# Patient Record
Sex: FEMALE | Race: WHITE | NOT HISPANIC OR LATINO | Employment: UNEMPLOYED | URBAN - METROPOLITAN AREA
[De-identification: names, ages, dates, MRNs, and addresses within clinical notes are randomized per-mention and may not be internally consistent; named-entity substitution may affect disease eponyms.]

---

## 2018-03-21 ENCOUNTER — HOSPITAL ENCOUNTER (EMERGENCY)
Facility: HOSPITAL | Age: 28
Discharge: HOME | End: 2018-03-21
Attending: FAMILY MEDICINE
Payer: MEDICAID

## 2018-03-21 VITALS
SYSTOLIC BLOOD PRESSURE: 127 MMHG | RESPIRATION RATE: 20 BRPM | TEMPERATURE: 98 F | DIASTOLIC BLOOD PRESSURE: 62 MMHG | WEIGHT: 180 LBS | BODY MASS INDEX: 33.13 KG/M2 | OXYGEN SATURATION: 100 % | HEART RATE: 125 BPM | HEIGHT: 62 IN

## 2018-03-21 DIAGNOSIS — R09.81 NASAL CONGESTION: Primary | ICD-10-CM

## 2018-03-21 DIAGNOSIS — R05.9 COUGH: ICD-10-CM

## 2018-03-21 LAB
ALBUMIN SERPL BCP-MCNC: 3.7 G/DL
ALP SERPL-CCNC: 139 U/L
ALT SERPL W/O P-5'-P-CCNC: 39 U/L
ANION GAP SERPL CALC-SCNC: 10 MMOL/L
AST SERPL-CCNC: 28 U/L
BASOPHILS # BLD AUTO: 0.06 K/UL
BASOPHILS NFR BLD: 0.5 %
BILIRUB SERPL-MCNC: 0.4 MG/DL
BNP SERPL-MCNC: <10 PG/ML
BUN SERPL-MCNC: 14 MG/DL
CALCIUM SERPL-MCNC: 9.6 MG/DL
CHLORIDE SERPL-SCNC: 108 MMOL/L
CO2 SERPL-SCNC: 22 MMOL/L
CREAT SERPL-MCNC: 0.9 MG/DL
DIFFERENTIAL METHOD: ABNORMAL
EOSINOPHIL # BLD AUTO: 0.2 K/UL
EOSINOPHIL NFR BLD: 1.3 %
ERYTHROCYTE [DISTWIDTH] IN BLOOD BY AUTOMATED COUNT: 12.8 %
EST. GFR  (AFRICAN AMERICAN): >60 ML/MIN/1.73 M^2
EST. GFR  (NON AFRICAN AMERICAN): >60 ML/MIN/1.73 M^2
GLUCOSE SERPL-MCNC: 107 MG/DL
HCT VFR BLD AUTO: 41.4 %
HGB BLD-MCNC: 13.8 G/DL
IMM GRANULOCYTES # BLD AUTO: 0.09 K/UL
IMM GRANULOCYTES NFR BLD AUTO: 0.7 %
LYMPHOCYTES # BLD AUTO: 3.2 K/UL
LYMPHOCYTES NFR BLD: 25.8 %
MCH RBC QN AUTO: 29.8 PG
MCHC RBC AUTO-ENTMCNC: 33.3 G/DL
MCV RBC AUTO: 89 FL
MONOCYTES # BLD AUTO: 1.2 K/UL
MONOCYTES NFR BLD: 9.2 %
NEUTROPHILS # BLD AUTO: 7.8 K/UL
NEUTROPHILS NFR BLD: 62.5 %
NRBC BLD-RTO: 0 /100 WBC
PLATELET # BLD AUTO: 251 K/UL
PMV BLD AUTO: 9.5 FL
POTASSIUM SERPL-SCNC: 3.8 MMOL/L
PROT SERPL-MCNC: 7.4 G/DL
RBC # BLD AUTO: 4.63 M/UL
SODIUM SERPL-SCNC: 140 MMOL/L
TROPONIN I SERPL DL<=0.01 NG/ML-MCNC: <0.006 NG/ML
WBC # BLD AUTO: 12.5 K/UL

## 2018-03-21 PROCEDURE — 99284 EMERGENCY DEPT VISIT MOD MDM: CPT | Mod: ,,, | Performed by: FAMILY MEDICINE

## 2018-03-21 PROCEDURE — 93005 ELECTROCARDIOGRAM TRACING: CPT

## 2018-03-21 PROCEDURE — 84484 ASSAY OF TROPONIN QUANT: CPT

## 2018-03-21 PROCEDURE — 25000003 PHARM REV CODE 250: Performed by: PHYSICIAN ASSISTANT

## 2018-03-21 PROCEDURE — 85025 COMPLETE CBC W/AUTO DIFF WBC: CPT

## 2018-03-21 PROCEDURE — 83880 ASSAY OF NATRIURETIC PEPTIDE: CPT

## 2018-03-21 PROCEDURE — 99284 EMERGENCY DEPT VISIT MOD MDM: CPT | Mod: 25

## 2018-03-21 PROCEDURE — 80053 COMPREHEN METABOLIC PANEL: CPT

## 2018-03-21 PROCEDURE — 93010 ELECTROCARDIOGRAM REPORT: CPT | Mod: ,,, | Performed by: INTERNAL MEDICINE

## 2018-03-21 RX ORDER — LORATADINE 10 MG/1
10 TABLET ORAL DAILY
COMMUNITY
End: 2018-12-11 | Stop reason: ALTCHOICE

## 2018-03-21 RX ORDER — BENZONATATE 100 MG/1
100 CAPSULE ORAL
Status: COMPLETED | OUTPATIENT
Start: 2018-03-21 | End: 2018-03-21

## 2018-03-21 RX ADMIN — BENZONATATE 100 MG: 100 CAPSULE ORAL at 07:03

## 2018-03-21 NOTE — ED NOTES
"Pt presents with CP x 2 weeks with congestion and URI symptoms. Pt was seen by PCP and given a steroid shot and and abx but states her symptoms have gotten worse after completion of medications. Pt was also told she was tachycardic, but that it was nothing to worry about. Pt reports palpitations with constant "achy" or "compression" to her left chest.   "

## 2018-03-21 NOTE — PROVIDER PROGRESS NOTES - EMERGENCY DEPT.
Encounter Date: 3/21/2018    ED Physician Progress Notes         EKG - STEMI Decision  Initial Reading: No STEMI present.    I, Jeri Matthews, am scribing for, and in the presence of, Dr. Vega. I performed the above scribed service and the documentation accurately describes the services I performed. I attest to the accuracy of the note.

## 2018-03-21 NOTE — ED NOTES
LOC: The patient is awake, alert and aware of environment with an appropriate affect, the patient is oriented x 3 and speaking appropriately.  APPEARANCE: Patient resting comfortably and in no acute distress, patient is clean and well groomed  SKIN: The skin is warm and dry, color consistent with ethnicity, patient has normal skin turgor and moist mucus membranes, skin intact, no breakdown or brusing noted.  MUSCULOSKELETAL: Patient moving all extremities well, no obvious swelling or deformities noted.   RESPIRATORY: Airway is open and patent, breath sounds clear throughout all lung fields; respirations are spontaneous, patient has a normal effort and rate, no accessory muscle use noted. Pt reports sinus congestion with URI symptoms.   CARDIAC: Patient has no peripheral edema noted, capillary refill < 3 seconds. Pt reports left sided CP with palpitations and is tachycardic.   ABDOMEN: Soft and non tender to palpation, no distention noted. Bowel sounds present x 4  NEUROLOGIC: PERRL, 3mm bilaterally, eyes open spontaneously, behavior appropriate to situation, follows commands, facial expression symmetrical, bilateral hand grasp equal and even, purposeful motor response noted, normal sensation in all extremities when touched with a finger.

## 2018-03-21 NOTE — PROVIDER PROGRESS NOTES - EMERGENCY DEPT.
Encounter Date: 3/21/2018    ED Physician Progress Notes         EKG - STEMI Decision  Initial Reading: No STEMI present.    Normal EKG---sinus tachycardia

## 2018-03-21 NOTE — ED PROVIDER NOTES
"Encounter Date: 3/21/2018    SCRIBE #1 NOTE: I, Jordan Lewis, am scribing for, and in the presence of,  Dr. Oliva. I have scribed the following portions of the note - Other sections scribed: Partial MDM.       History     Chief Complaint   Patient presents with    Nasal Congestion     Pt states "I can't breathe out of my nose".  (+) cough.  Pt had steroid injection and abx without any relief.      Patient is a 27-year-old female with past medical history of depression who presents to the emergency department due to a 2 week history of nonproductive cough and nasal congestion.  Patient states that she went to see her PCP 2 weeks ago for above complaints and EKG and blood work was obtained.  Patient states that she was placed on antibiotic for a upper respiratory infection as well as given a steroid shot.  Patient states she has continued to have a nonproductive cough and shortness of breath.  Patient states she woke this morning and was experiencing chest pressure.  Patient states the chest pressure is constant in nature and does not radiate.  Patient states that pain feels like something is sitting on her chest.  Patient states she does smoke cigarettes and therefore has a constant cough.  Patient denies any fevers, chills, nausea, vomiting, or any other complaints.          Review of patient's allergies indicates:   Allergen Reactions    Pcn [penicillins] Hives     Past Medical History:   Diagnosis Date    Depressed     Depression     Endometriosis      Past Surgical History:   Procedure Laterality Date     SECTION      x2    HYSTERECTOMY      partial     TUBAL LIGATION       History reviewed. No pertinent family history.  Social History   Substance Use Topics    Smoking status: Current Every Day Smoker     Packs/day: 0.50     Types: Cigarettes    Smokeless tobacco: Not on file    Alcohol use No     Review of Systems   Constitutional: Negative for activity change, appetite change, diaphoresis, " fatigue and fever.   HENT: Negative for congestion, dental problem, drooling, ear pain, facial swelling, sore throat and trouble swallowing.    Eyes: Negative for pain, discharge and visual disturbance.   Respiratory: Positive for cough and shortness of breath. Negative for apnea and chest tightness.    Cardiovascular: Positive for chest pain. Negative for palpitations.   Gastrointestinal: Negative for abdominal distention, anal bleeding, blood in stool, diarrhea, nausea and vomiting.   Endocrine: Negative for cold intolerance and polydipsia.   Genitourinary: Negative for decreased urine volume, difficulty urinating, enuresis, frequency and hematuria.   Musculoskeletal: Negative for arthralgias, gait problem, myalgias and neck stiffness.   Skin: Negative for color change and pallor.   Allergic/Immunologic: Negative for environmental allergies.   Neurological: Negative for dizziness, syncope, numbness and headaches.   Psychiatric/Behavioral: Negative for agitation, confusion and dysphoric mood.       Physical Exam     Initial Vitals [03/21/18 1628]   BP Pulse Resp Temp SpO2   (!) 175/100 (!) 128 20 97.8 °F (36.6 °C) 100 %      MAP       125         Physical Exam    Nursing note and vitals reviewed.  Constitutional: She appears well-developed and well-nourished. She is not diaphoretic. No distress.   HENT:   Head: Normocephalic and atraumatic.   Neck: Normal range of motion. Neck supple.   Cardiovascular: Normal rate, regular rhythm and normal heart sounds. Exam reveals no gallop and no friction rub.    No murmur heard.  Pulmonary/Chest: Breath sounds normal. She has no wheezes. She has no rhonchi. She has no rales.   Abdominal: Soft. Bowel sounds are normal. There is no tenderness. There is no rebound and no guarding.   Musculoskeletal: Normal range of motion.   Neurological: She is alert and oriented to person, place, and time.   Skin: Skin is warm and dry. No rash noted. No erythema.   Psychiatric: She has a normal  mood and affect.         ED Course   Procedures  Labs Reviewed   CBC W/ AUTO DIFFERENTIAL - Abnormal; Notable for the following:        Result Value    Immature Granulocytes 0.7 (*)     Gran # (ANC) 7.8 (*)     Immature Grans (Abs) 0.09 (*)     Mono # 1.2 (*)     All other components within normal limits   COMPREHENSIVE METABOLIC PANEL - Abnormal; Notable for the following:     CO2 22 (*)     Alkaline Phosphatase 139 (*)     All other components within normal limits   TROPONIN I   B-TYPE NATRIURETIC PEPTIDE             Medical Decision Making:   History:   Old Medical Records: I decided to obtain old medical records.  Clinical Tests:   Lab Tests: Ordered and Reviewed  Radiological Study: Ordered and Reviewed  Medical Tests: Ordered and Reviewed       APC / Resident Notes:   Patient is a 27-year-old female with past medical history of depression who presents to the emergency department due to a 2 week history of nonproductive cough and nasal congestion.  Physical exam reveals female in no acute distress.  Heart regular rate and rhythm.  Lungs clear to auscultation bilaterally.  Abdomen soft nontender nondistended.  Will obtain lab work and reassess.    CBC shows no leukocytosis with a white blood cell of 12.5.  CMP shows CO2 22 and alkaline phosphatase 139.  Troponin 0.006.  Chest x-ray shows no acute hernia pulmonary finding.  Patient will be discharged home in stable condition.  Patient is follow-up with PCP and near future.  Strict return precautions given.  Plan of treatment discussed with attending physician and he is agreeable.       Scribe Attestation:   Scribe #1: I performed the above scribed service and the documentation accurately describes the services I performed. I attest to the accuracy of the note.               Clinical Impression:   The primary encounter diagnosis was Nasal congestion. A diagnosis of Cough was also pertinent to this visit.    Disposition:   Disposition: Discharged  Condition:  Stable                        Lyly Blum PA-C  03/21/18 2011

## 2018-12-11 ENCOUNTER — OFFICE VISIT (OUTPATIENT)
Dept: SURGERY | Facility: CLINIC | Age: 28
End: 2018-12-11
Payer: MEDICAID

## 2018-12-11 VITALS
BODY MASS INDEX: 35.91 KG/M2 | TEMPERATURE: 98 F | WEIGHT: 195.13 LBS | HEIGHT: 62 IN | HEART RATE: 92 BPM | DIASTOLIC BLOOD PRESSURE: 74 MMHG | SYSTOLIC BLOOD PRESSURE: 116 MMHG

## 2018-12-11 DIAGNOSIS — K31.84 GASTROPARESIS: Primary | ICD-10-CM

## 2018-12-11 PROCEDURE — 99999 PR PBB SHADOW E&M-EST. PATIENT-LVL III: CPT | Mod: PBBFAC,,, | Performed by: SURGERY

## 2018-12-11 PROCEDURE — 99203 OFFICE O/P NEW LOW 30 MIN: CPT | Mod: S$PBB,,, | Performed by: SURGERY

## 2018-12-11 PROCEDURE — 99213 OFFICE O/P EST LOW 20 MIN: CPT | Mod: PBBFAC,PO | Performed by: SURGERY

## 2018-12-11 RX ORDER — ALPRAZOLAM 0.5 MG/1
0.5 TABLET ORAL 2 TIMES DAILY PRN
COMMUNITY
End: 2018-12-17

## 2018-12-11 RX ORDER — PANTOPRAZOLE SODIUM 20 MG/1
20 TABLET, DELAYED RELEASE ORAL DAILY
Qty: 30 TABLET | Refills: 11 | Status: SHIPPED | OUTPATIENT
Start: 2018-12-11 | End: 2018-12-17

## 2018-12-11 NOTE — LETTER
December 11, 2018      Virgie Hawkins, MICHAEL-DEMETRIUS  [address] [facility] [address]           [facility]  [address] [place] [id number]  Phone: [phone]          Patient: Norma Kang   MR Number: [id number]   YOB: 1990   Date of Visit: 12/11/2018       Dear Virgie Hawkins:    Thank you for referring Norma Kang to me for evaluation. Attached you will find relevant portions of my assessment and plan of care.    If you have questions, please do not hesitate to call me. I look forward to following Norma Kang along with you.    Sincerely,    Jared Spear MD    Enclosure  CC:  No Recipients    If you would like to receive this communication electronically, please contact [email or web address] or [phone] to request more information on [employer] access.    For providers and/or their staff who would like to refer a patient to [facility], please contact us through our one-stop-shop provider referral line, Inocente Simmons, at [phone].    If you feel you have received this communication in error or would no longer like to receive these types of communications, please e-mail [email or web address]

## 2018-12-11 NOTE — PROGRESS NOTES
Norma is 28-year-old  female patient who is being seen at the request of  at the [facility] in [place], [place].  She was referred for the management of gastroparesis of unknown etiology.  She had been seen by her gastroenterologist for the management of constant nausea.  The upper endoscopic examination revealed that the patient has 2 cm hiatal hernia with LA grade 2 esophagitis.  But for unknown reason, she had not been taking medication for that.  Since the nausea is very nonspecific symptoms, I would tried to manage that hiatal hernia with esophagitis with Protonix for a month to observe if there is some improvement or not.  In a month, we will proceed with repeat gastric emptying study at our institution.  Unfortunately, the patient was informed that gastric pacemaker is not an option for her insurance.  She will follow up with me in a month.  Total time approximately half an hour was spent with this patient, more than 50% of that was spent for treatment planning and counseling.    Jared Spear

## 2018-12-17 ENCOUNTER — HOSPITAL ENCOUNTER (EMERGENCY)
Facility: HOSPITAL | Age: 28
Discharge: HOME | End: 2018-12-17
Attending: EMERGENCY MEDICINE
Payer: MEDICAID

## 2018-12-17 VITALS
BODY MASS INDEX: 34.77 KG/M2 | WEIGHT: 188.94 LBS | SYSTOLIC BLOOD PRESSURE: 135 MMHG | TEMPERATURE: 98 F | RESPIRATION RATE: 18 BRPM | DIASTOLIC BLOOD PRESSURE: 87 MMHG | HEART RATE: 92 BPM | OXYGEN SATURATION: 100 % | HEIGHT: 62 IN

## 2018-12-17 DIAGNOSIS — R20.2 PARESTHESIA: Primary | ICD-10-CM

## 2018-12-17 LAB
ALBUMIN SERPL BCP-MCNC: 4.3 G/DL
ALP SERPL-CCNC: 140 U/L
ALT SERPL W/O P-5'-P-CCNC: 33 U/L
ANION GAP SERPL CALC-SCNC: 10 MMOL/L
AST SERPL-CCNC: 21 U/L
BASOPHILS # BLD AUTO: 0.03 K/UL
BASOPHILS NFR BLD: 0.3 %
BILIRUB SERPL-MCNC: 0.8 MG/DL
BILIRUB UR QL STRIP: NEGATIVE
BNP SERPL-MCNC: 15 PG/ML
BUN SERPL-MCNC: 10 MG/DL
CALCIUM SERPL-MCNC: 10.1 MG/DL
CHLORIDE SERPL-SCNC: 105 MMOL/L
CK SERPL-CCNC: 72 U/L
CLARITY UR REFRACT.AUTO: CLEAR
CO2 SERPL-SCNC: 25 MMOL/L
COLOR UR AUTO: YELLOW
CREAT SERPL-MCNC: 1 MG/DL
DIFFERENTIAL METHOD: NORMAL
EOSINOPHIL # BLD AUTO: 0.1 K/UL
EOSINOPHIL NFR BLD: 0.8 %
ERYTHROCYTE [DISTWIDTH] IN BLOOD BY AUTOMATED COUNT: 13 %
EST. GFR  (AFRICAN AMERICAN): >60 ML/MIN/1.73 M^2
EST. GFR  (NON AFRICAN AMERICAN): >60 ML/MIN/1.73 M^2
GLUCOSE SERPL-MCNC: 94 MG/DL
GLUCOSE UR QL STRIP: NEGATIVE
HCT VFR BLD AUTO: 41 %
HGB BLD-MCNC: 14 G/DL
HGB UR QL STRIP: NEGATIVE
KETONES UR QL STRIP: NEGATIVE
LEUKOCYTE ESTERASE UR QL STRIP: NEGATIVE
LYMPHOCYTES # BLD AUTO: 4.4 K/UL
LYMPHOCYTES NFR BLD: 37.5 %
MCH RBC QN AUTO: 30.3 PG
MCHC RBC AUTO-ENTMCNC: 34.1 G/DL
MCV RBC AUTO: 89 FL
MONOCYTES # BLD AUTO: 0.9 K/UL
MONOCYTES NFR BLD: 8.1 %
NEUTROPHILS # BLD AUTO: 6.2 K/UL
NEUTROPHILS NFR BLD: 53.1 %
NITRITE UR QL STRIP: NEGATIVE
PH UR STRIP: 6 [PH] (ref 5–8)
PLATELET # BLD AUTO: 270 K/UL
PMV BLD AUTO: 9.9 FL
POTASSIUM SERPL-SCNC: 3.8 MMOL/L
PROT SERPL-MCNC: 7.6 G/DL
PROT UR QL STRIP: NEGATIVE
RBC # BLD AUTO: 4.62 M/UL
SODIUM SERPL-SCNC: 140 MMOL/L
SP GR UR STRIP: 1.01 (ref 1–1.03)
TROPONIN I SERPL DL<=0.01 NG/ML-MCNC: <0.006 NG/ML
URN SPEC COLLECT METH UR: NORMAL
UROBILINOGEN UR STRIP-ACNC: NEGATIVE EU/DL
WBC # BLD AUTO: 11.64 K/UL

## 2018-12-17 PROCEDURE — 85025 COMPLETE CBC W/AUTO DIFF WBC: CPT

## 2018-12-17 PROCEDURE — 99900035 HC TECH TIME PER 15 MIN (STAT)

## 2018-12-17 PROCEDURE — 99285 EMERGENCY DEPT VISIT HI MDM: CPT | Mod: 25

## 2018-12-17 PROCEDURE — 84484 ASSAY OF TROPONIN QUANT: CPT

## 2018-12-17 PROCEDURE — 81003 URINALYSIS AUTO W/O SCOPE: CPT

## 2018-12-17 PROCEDURE — 80053 COMPREHEN METABOLIC PANEL: CPT

## 2018-12-17 PROCEDURE — 82550 ASSAY OF CK (CPK): CPT

## 2018-12-17 PROCEDURE — 93005 ELECTROCARDIOGRAM TRACING: CPT

## 2018-12-17 PROCEDURE — 83880 ASSAY OF NATRIURETIC PEPTIDE: CPT

## 2018-12-17 PROCEDURE — 93010 ELECTROCARDIOGRAM REPORT: CPT | Mod: ,,, | Performed by: INTERNAL MEDICINE

## 2018-12-17 NOTE — ED PROVIDER NOTES
Encounter Date: 2018       History     Chief Complaint   Patient presents with    Numbness     Patient has a hx of seizures and while driving left side of body and face became numb. Patient under a lot of stress lost spouse in sept     Patient states she had numbness and tingling to her entire left arm just prior to arrival.  Denies any pain or weakness.      The history is provided by the patient.   General Illness    The current episode started just prior to arrival. The problem occurs rarely. The problem has been unchanged. Nothing relieves the symptoms. Nothing aggravates the symptoms. Pertinent negatives include no fever, no abdominal pain, no diarrhea, no nausea, no vomiting, no sore throat, no cough, no shortness of breath and no rash. She has received no recent medical care.     Review of patient's allergies indicates:   Allergen Reactions    Pcn [penicillins] Hives     Past Medical History:   Diagnosis Date    Depressed     Depression     Endometriosis     Seizures      Past Surgical History:   Procedure Laterality Date     SECTION      x2    HYSTERECTOMY      partial     TUBAL LIGATION       Family History   Problem Relation Age of Onset    Diabetes Mother     Hypertension Mother     Diabetes Father     Depression Father      Social History     Tobacco Use    Smoking status: Current Every Day Smoker     Packs/day: 0.50     Types: Cigarettes    Smokeless tobacco: Never Used   Substance Use Topics    Alcohol use: No    Drug use: No     Review of Systems   Constitutional: Negative for fever.   HENT: Negative for sore throat.    Respiratory: Negative for cough and shortness of breath.    Cardiovascular: Negative for chest pain.   Gastrointestinal: Negative for abdominal pain, diarrhea, nausea and vomiting.   Genitourinary: Negative for dysuria.   Musculoskeletal: Negative for back pain.   Skin: Negative for rash.   Neurological: Negative for weakness.   Hematological: Does not  bruise/bleed easily.       Physical Exam     Initial Vitals [12/17/18 1714]   BP Pulse Resp Temp SpO2   (!) 174/96 104 18 98.4 °F (36.9 °C) 100 %      MAP       --         Physical Exam    Nursing note and vitals reviewed.  Constitutional: She appears well-developed and well-nourished. No distress.   HENT:   Head: Normocephalic and atraumatic.   Mouth/Throat: Oropharynx is clear and moist.   Eyes: Conjunctivae and EOM are normal. Pupils are equal, round, and reactive to light.   Neck: Normal range of motion. Neck supple.   Cardiovascular: Normal rate, regular rhythm and normal heart sounds. Exam reveals no gallop and no friction rub.    No murmur heard.  Pulmonary/Chest: Breath sounds normal. No respiratory distress. She has no wheezes. She has no rhonchi. She has no rales.   Abdominal: Soft. Bowel sounds are normal. She exhibits no distension and no mass. There is no tenderness. There is no rebound and no guarding.   Musculoskeletal: Normal range of motion. She exhibits no edema or tenderness.   Neurological: She is alert and oriented to person, place, and time. She has normal strength. No sensory deficit.   Skin: Skin is warm and dry. No rash noted.   Psychiatric: She has a normal mood and affect. Thought content normal.         ED Course   Procedures  Labs Reviewed   COMPREHENSIVE METABOLIC PANEL - Abnormal; Notable for the following components:       Result Value    Alkaline Phosphatase 140 (*)     All other components within normal limits   CBC W/ AUTO DIFFERENTIAL   URINALYSIS, REFLEX TO URINE CULTURE    Narrative:     Preferred Collection Type->Urine, Clean Catch   B-TYPE NATRIURETIC PEPTIDE   CK   TROPONIN I     Results for orders placed or performed during the hospital encounter of 12/17/18   CBC auto differential   Result Value Ref Range    WBC 11.64 3.90 - 12.70 K/uL    RBC 4.62 4.00 - 5.40 M/uL    Hemoglobin 14.0 12.0 - 16.0 g/dL    Hematocrit 41.0 37.0 - 48.5 %    MCV 89 82 - 98 fL    MCH 30.3 27.0 -  31.0 pg    MCHC 34.1 32.0 - 36.0 g/dL    RDW 13.0 11.5 - 14.5 %    Platelets 270 150 - 350 K/uL    MPV 9.9 9.2 - 12.9 fL    Gran # (ANC) 6.2 1.8 - 7.7 K/uL    Lymph # 4.4 1.0 - 4.8 K/uL    Mono # 0.9 0.3 - 1.0 K/uL    Eos # 0.1 0.0 - 0.5 K/uL    Baso # 0.03 0.00 - 0.20 K/uL    Gran% 53.1 38.0 - 73.0 %    Lymph% 37.5 18.0 - 48.0 %    Mono% 8.1 4.0 - 15.0 %    Eosinophil% 0.8 0.0 - 8.0 %    Basophil% 0.3 0.0 - 1.9 %    Differential Method Automated    Comprehensive metabolic panel   Result Value Ref Range    Sodium 140 136 - 145 mmol/L    Potassium 3.8 3.5 - 5.1 mmol/L    Chloride 105 95 - 110 mmol/L    CO2 25 23 - 29 mmol/L    Glucose 94 70 - 110 mg/dL    BUN, Bld 10 6 - 20 mg/dL    Creatinine 1.0 0.5 - 1.4 mg/dL    Calcium 10.1 8.7 - 10.5 mg/dL    Total Protein 7.6 6.0 - 8.4 g/dL    Albumin 4.3 3.5 - 5.2 g/dL    Total Bilirubin 0.8 0.1 - 1.0 mg/dL    Alkaline Phosphatase 140 (H) 55 - 135 U/L    AST 21 10 - 40 U/L    ALT 33 10 - 44 U/L    Anion Gap 10 8 - 16 mmol/L    eGFR if African American >60.0 >60 mL/min/1.73 m^2    eGFR if non African American >60.0 >60 mL/min/1.73 m^2   Urinalysis, Reflex to Urine Culture Urine, Clean Catch   Result Value Ref Range    Specimen UA Urine, Clean Catch     Color, UA Yellow Yellow, Straw, Ariane    Appearance, UA Clear Clear    pH, UA 6.0 5.0 - 8.0    Specific Gravity, UA 1.015 1.005 - 1.030    Protein, UA Negative Negative    Glucose, UA Negative Negative    Ketones, UA Negative Negative    Bilirubin (UA) Negative Negative    Occult Blood UA Negative Negative    Nitrite, UA Negative Negative    Urobilinogen, UA Negative <2.0 EU/dL    Leukocytes, UA Negative Negative   Brain natriuretic peptide   Result Value Ref Range    BNP 15 0 - 99 pg/mL   CK   Result Value Ref Range    CPK 72 20 - 180 U/L   Troponin I   Result Value Ref Range    Troponin I <0.006 0.000 - 0.026 ng/mL       EKG Readings: (Independently Interpreted)   Rhythm: Normal Sinus Rhythm. Heart Rate: 90. Ectopy: No Ectopy.  Conduction: Normal. ST Segments: Normal ST Segments. T Waves: Normal. Clinical Impression: Normal Sinus Rhythm       Imaging Results          CT Head Without Contrast (Final result)  Result time 12/17/18 18:25:40    Final result by Rl Jefferson III, MD (12/17/18 18:25:40)                 Impression:      Normal non contrast CT scan of the brain.    All CT scans at this facility are performed  using dose modulation techniques as appropriate to performed exam including the following:  automated exposure control; adjustment of mA and/or kV according to the patients size (this includes techniques or standardized protocols for targeted exams where dose is matched to indication/reason for exam: i.e. extremities or head);  iterative reconstruction technique.      Electronically signed by: Rl Jefferson MD  Date:    12/17/2018  Time:    18:25             Narrative:    EXAMINATION:  CT HEAD WITHOUT CONTRAST    CLINICAL HISTORY:  Dizziness;left sided numbness;    TECHNIQUE:  Standard non contrast CT scan of the brain.    COMPARISON:  Two thousand ten    FINDINGS:  No detrimental change.  The brain and ventricles remain normal for patient age.  There is no mass lesion, hemorrhage, hydrocephalus, or midline shift.  No acute/depressed skull fracture.  No lytic or blastic change.  No acute bony abnormality is suggested.                               X-Ray Cervical Spine AP And Lateral (Final result)  Result time 12/17/18 18:15:35    Final result by Rl Jefferson III, MD (12/17/18 18:15:35)                 Impression:      No acute cervical fracture or significant subluxation.  No significant prevertebral soft tissue swelling.      Electronically signed by: Rl Jefferson MD  Date:    12/17/2018  Time:    18:15             Narrative:    EXAMINATION:  XR CERVICAL SPINE AP LATERAL    CLINICAL HISTORY:  Numbness (782.0);    FINDINGS:  There is mild straightening of the normal lordotic curve which could be  "positional or due to muscle spasm.  No acute cervical fracture or significant subluxation.  No lytic or blastic change.                               X-Ray Chest PA And Lateral (Final result)  Result time 12/17/18 18:14:20    Final result by Rl Jefferson III, MD (12/17/18 18:14:20)                 Impression:      Negative two-view chest x-ray.      Electronically signed by: Rl Jefferson MD  Date:    12/17/2018  Time:    18:14             Narrative:    EXAMINATION:  XR CHEST PA AND LATERAL    CLINICAL HISTORY:  weakness;    COMPARISON:  March    FINDINGS:  Heart size is normal. The lung fields are clear. No acute pulmonary infiltrate.  No detrimental change.                                        ED Vital Signs:  Vitals:    12/17/18 1714 12/17/18 1735 12/17/18 1744   BP: (!) 174/96  135/87   Pulse: 104 92 92   Resp: 18  18   Temp: 98.4 °F (36.9 °C)     TempSrc: Oral     SpO2: 100%  100%   Weight: 85.7 kg (188 lb 15 oz)     Height: 5' 2" (1.575 m)           Abnormal Lab Results:  Labs Reviewed   COMPREHENSIVE METABOLIC PANEL - Abnormal; Notable for the following components:       Result Value    Alkaline Phosphatase 140 (*)     All other components within normal limits   CBC W/ AUTO DIFFERENTIAL   URINALYSIS, REFLEX TO URINE CULTURE    Narrative:     Preferred Collection Type->Urine, Clean Catch   B-TYPE NATRIURETIC PEPTIDE   CK   TROPONIN I          All Lab Results:        Imaging Results:  Imaging Results          CT Head Without Contrast (Final result)  Result time 12/17/18 18:25:40    Final result by Rl Jefferson III, MD (12/17/18 18:25:40)                 Impression:      Normal non contrast CT scan of the brain.    All CT scans at this facility are performed  using dose modulation techniques as appropriate to performed exam including the following:  automated exposure control; adjustment of mA and/or kV according to the patients size (this includes techniques or standardized protocols for " targeted exams where dose is matched to indication/reason for exam: i.e. extremities or head);  iterative reconstruction technique.      Electronically signed by: Rl Jefferson MD  Date:    12/17/2018  Time:    18:25             Narrative:    EXAMINATION:  CT HEAD WITHOUT CONTRAST    CLINICAL HISTORY:  Dizziness;left sided numbness;    TECHNIQUE:  Standard non contrast CT scan of the brain.    COMPARISON:  Two thousand ten    FINDINGS:  No detrimental change.  The brain and ventricles remain normal for patient age.  There is no mass lesion, hemorrhage, hydrocephalus, or midline shift.  No acute/depressed skull fracture.  No lytic or blastic change.  No acute bony abnormality is suggested.                               X-Ray Cervical Spine AP And Lateral (Final result)  Result time 12/17/18 18:15:35    Final result by Rl Jefferson III, MD (12/17/18 18:15:35)                 Impression:      No acute cervical fracture or significant subluxation.  No significant prevertebral soft tissue swelling.      Electronically signed by: Rl Jefferson MD  Date:    12/17/2018  Time:    18:15             Narrative:    EXAMINATION:  XR CERVICAL SPINE AP LATERAL    CLINICAL HISTORY:  Numbness (782.0);    FINDINGS:  There is mild straightening of the normal lordotic curve which could be positional or due to muscle spasm.  No acute cervical fracture or significant subluxation.  No lytic or blastic change.                               X-Ray Chest PA And Lateral (Final result)  Result time 12/17/18 18:14:20    Final result by Rl Jefferson III, MD (12/17/18 18:14:20)                 Impression:      Negative two-view chest x-ray.      Electronically signed by: Rl Jefferson MD  Date:    12/17/2018  Time:    18:14             Narrative:    EXAMINATION:  XR CHEST PA AND LATERAL    CLINICAL HISTORY:  weakness;    COMPARISON:  March    FINDINGS:  Heart size is normal. The lung fields are clear. No acute pulmonary  infiltrate.  No detrimental change.                                   The Emergency Provider reviewed the vital signs and test results, which are outlined above.    ED Discussions:  7:11 PM Pt reports sx have improved. Neurological exam is normal. No M/S loss Normal strength bilateral, Normal speech, symmetric facial m. Nonfocal exam.   7:12 PM - Counseling: Spoke with the patient and discussed todays findings, in addition to providing specific details for the plan of care and counseling regarding the diagnosis and prognosis. Questions are answered at this time.  I have not determined a specific etiology for patient's symptoms based on evaluation in the ED, but I have low suspicion for medical, surgical or other life threatening illness and I believe patient is safe for discharge.  With lack of etiology for the patient's complaints, I specifically counseled the patient and/or family/caretaker that despite an unrevealing evaluation in the ED, patient may still be at risk for serious, even life threatening illness.  As such, patient was instructed to return immediately for any worsening or change in current symptoms, or for any concern.                         Clinical Impression:       ICD-10-CM ICD-9-CM   1. Paresthesia R20.2 782.0           Disposition:   Disposition: Discharged  Condition: Stable                        Govind Garcia MD  12/17/18 1914

## 2020-04-25 ENCOUNTER — HISTORICAL (OUTPATIENT)
Dept: ADMINISTRATIVE | Facility: HOSPITAL | Age: 30
End: 2020-04-25

## 2020-04-25 LAB
ALBUMIN SERPL BCP-MCNC: 4 G/DL (ref 3.5–5)
ALBUMIN/GLOB SERPL ELPH: 1 {RATIO} (ref 1.5–2.2)
ALP SERPL-CCNC: 172 U/L (ref 45–117)
ALT SERPL W P-5'-P-CCNC: 32 U/L (ref 13–56)
ANION GAP SERPL CALC-SCNC: 12.8 MEQ/L (ref 10–20)
AST SERPL-CCNC: 17 U/L (ref 15–37)
BASOPHILS NFR BLD: 0.1 10 (ref 0–0.1)
BASOPHILS NFR BLD: 0.4 % (ref 0–1.5)
BILIRUB SERPL-MCNC: 0.46 MG/DL (ref 0.2–1)
BUN SERPL-MCNC: 6 MG/DL (ref 7–18)
CALCIUM SERPL-MCNC: 9.5 MG/DL (ref 8.5–10.1)
CHLORIDE SERPL-SCNC: 110 MMOL/L (ref 98–107)
CO2 SERPL-SCNC: 23 MMOL/L (ref 22–32)
CREAT SERPL-MCNC: 1.16 MG/DL (ref 0.55–1.02)
EGFR: 59 ML/MIN/1.73M
EOSINOPHIL NFR BLD: 0.1 10 (ref 0–0.7)
EOSINOPHIL NFR BLD: 0.4 % (ref 0–7)
ERYTHROCYTE [DISTWIDTH] IN BLOOD BY AUTOMATED COUNT: 13 % (ref 11.5–14.5)
GLOBULIN: 3.9 G/DL (ref 2.3–3.5)
GLUCOSE SERPL-MCNC: 105 MG/DL (ref 70–99)
GRAN #: 8.63 10 (ref 2–7.5)
GRAN%: 0.4 %
GRAN%: 67.1 % (ref 50–80)
HCT VFR BLD AUTO: 43.4 % (ref 37.7–47.9)
HGB BLD-MCNC: 14.8 G/DL (ref 12.2–16.2)
IMMATURE GRANULOCYTES #: 0.05 10
LYMPH #: 3.4 10 (ref 1–3.5)
LYMPH%: 26.5 % (ref 12–50)
MCH RBC QN AUTO: 30.6 PG (ref 27–31)
MCHC RBC AUTO-ENTMCNC: 34.1 G% (ref 32–35)
MCV RBC AUTO: 89.7 FL (ref 80–97)
MONO #: 0.7 10 (ref 0–0.8)
MONO%: 5.2 % (ref 0–12)
OSMOC: 281 MOSM/KG (ref 275–295)
PMV BLD AUTO: 261 10 (ref 142–424)
PMV BLD AUTO: 9.6 FL (ref 7.4–10.4)
POTASSIUM SERPL-SCNC: 3.8 MMOL/L (ref 3.5–5.1)
PROT SERPL-MCNC: 7.9 G/DL (ref 6.4–8.2)
RBC # BLD AUTO: 4.84 M/UL (ref 4.04–5.48)
SODIUM BLD-SCNC: 142 MMOL/L (ref 136–145)
WBC # BLD AUTO: 12.9 10 (ref 4–10.2)

## 2020-07-27 ENCOUNTER — HOSPITAL ENCOUNTER (EMERGENCY)
Facility: HOSPITAL | Age: 30
Discharge: HOME | End: 2020-07-27
Attending: FAMILY MEDICINE
Payer: MEDICAID

## 2020-07-27 VITALS
DIASTOLIC BLOOD PRESSURE: 79 MMHG | TEMPERATURE: 98 F | SYSTOLIC BLOOD PRESSURE: 112 MMHG | RESPIRATION RATE: 17 BRPM | HEART RATE: 80 BPM | OXYGEN SATURATION: 99 %

## 2020-07-27 DIAGNOSIS — G40.909 SEIZURE DISORDER: Primary | ICD-10-CM

## 2020-07-27 DIAGNOSIS — R56.9 SEIZURE: ICD-10-CM

## 2020-07-27 LAB
ALBUMIN SERPL BCP-MCNC: 3.8 G/DL (ref 3.5–5.2)
ALP SERPL-CCNC: 179 U/L (ref 55–135)
ALT SERPL W/O P-5'-P-CCNC: 32 U/L (ref 10–44)
AMPHET+METHAMPHET UR QL: NEGATIVE
ANION GAP SERPL CALC-SCNC: 16 MMOL/L (ref 8–16)
AST SERPL-CCNC: 20 U/L (ref 10–40)
B-HCG UR QL: NEGATIVE
BARBITURATES UR QL SCN>200 NG/ML: NEGATIVE
BASOPHILS # BLD AUTO: 0.05 K/UL (ref 0–0.2)
BASOPHILS NFR BLD: 0.3 % (ref 0–1.9)
BENZODIAZ UR QL SCN>200 NG/ML: NEGATIVE
BILIRUB SERPL-MCNC: 0.5 MG/DL (ref 0.1–1)
BILIRUB UR QL STRIP: NEGATIVE
BUN SERPL-MCNC: 12 MG/DL (ref 6–20)
BZE UR QL SCN: NEGATIVE
CALCIUM SERPL-MCNC: 8.8 MG/DL (ref 8.7–10.5)
CANNABINOIDS UR QL SCN: NEGATIVE
CHLORIDE SERPL-SCNC: 103 MMOL/L (ref 95–110)
CLARITY UR: CLEAR
CO2 SERPL-SCNC: 25 MMOL/L (ref 23–29)
COLOR UR: YELLOW
CREAT SERPL-MCNC: 1.1 MG/DL (ref 0.5–1.4)
CREAT UR-MCNC: 119 MG/DL (ref 15–325)
DIFFERENTIAL METHOD: ABNORMAL
EOSINOPHIL # BLD AUTO: 0.1 K/UL (ref 0–0.5)
EOSINOPHIL NFR BLD: 0.3 % (ref 0–8)
ERYTHROCYTE [DISTWIDTH] IN BLOOD BY AUTOMATED COUNT: 13.2 % (ref 11.5–14.5)
EST. GFR  (AFRICAN AMERICAN): >60 ML/MIN/1.73 M^2
EST. GFR  (NON AFRICAN AMERICAN): >60 ML/MIN/1.73 M^2
GLUCOSE SERPL-MCNC: 99 MG/DL (ref 70–110)
GLUCOSE UR QL STRIP: NEGATIVE
HCT VFR BLD AUTO: 42.1 % (ref 37–48.5)
HGB BLD-MCNC: 14.1 G/DL (ref 12–16)
HGB UR QL STRIP: NEGATIVE
IMM GRANULOCYTES # BLD AUTO: 0.09 K/UL (ref 0–0.04)
IMM GRANULOCYTES NFR BLD AUTO: 0.6 % (ref 0–0.5)
KETONES UR QL STRIP: NEGATIVE
LEUKOCYTE ESTERASE UR QL STRIP: NEGATIVE
LYMPHOCYTES # BLD AUTO: 2.3 K/UL (ref 1–4.8)
LYMPHOCYTES NFR BLD: 15.3 % (ref 18–48)
MAGNESIUM SERPL-MCNC: 2.3 MG/DL (ref 1.6–2.6)
MCH RBC QN AUTO: 31.1 PG (ref 27–31)
MCHC RBC AUTO-ENTMCNC: 33.5 G/DL (ref 32–36)
MCV RBC AUTO: 93 FL (ref 82–98)
METHADONE UR QL SCN>300 NG/ML: NEGATIVE
MONOCYTES # BLD AUTO: 0.7 K/UL (ref 0.3–1)
MONOCYTES NFR BLD: 4.5 % (ref 4–15)
NEUTROPHILS # BLD AUTO: 11.8 K/UL (ref 1.8–7.7)
NEUTROPHILS NFR BLD: 79 % (ref 38–73)
NITRITE UR QL STRIP: NEGATIVE
NRBC BLD-RTO: 0 /100 WBC
OPIATES UR QL SCN: NEGATIVE
PCP UR QL SCN>25 NG/ML: NEGATIVE
PH UR STRIP: 5 [PH] (ref 5–8)
PLATELET # BLD AUTO: 270 K/UL (ref 150–350)
PMV BLD AUTO: 9.6 FL (ref 9.2–12.9)
POTASSIUM SERPL-SCNC: 4 MMOL/L (ref 3.5–5.1)
PROT SERPL-MCNC: 7.4 G/DL (ref 6–8.4)
PROT UR QL STRIP: NEGATIVE
RBC # BLD AUTO: 4.53 M/UL (ref 4–5.4)
SODIUM SERPL-SCNC: 144 MMOL/L (ref 136–145)
SP GR UR STRIP: 1.01 (ref 1–1.03)
TOXICOLOGY INFORMATION: NORMAL
URN SPEC COLLECT METH UR: NORMAL
UROBILINOGEN UR STRIP-ACNC: 1 EU/DL
WBC # BLD AUTO: 14.95 K/UL (ref 3.9–12.7)

## 2020-07-27 PROCEDURE — 93010 EKG 12-LEAD: ICD-10-PCS | Mod: ,,, | Performed by: INTERNAL MEDICINE

## 2020-07-27 PROCEDURE — 83735 ASSAY OF MAGNESIUM: CPT

## 2020-07-27 PROCEDURE — 85025 COMPLETE CBC W/AUTO DIFF WBC: CPT

## 2020-07-27 PROCEDURE — 80053 COMPREHEN METABOLIC PANEL: CPT

## 2020-07-27 PROCEDURE — 81003 URINALYSIS AUTO W/O SCOPE: CPT | Mod: 59

## 2020-07-27 PROCEDURE — 80307 DRUG TEST PRSMV CHEM ANLYZR: CPT

## 2020-07-27 PROCEDURE — 81025 URINE PREGNANCY TEST: CPT

## 2020-07-27 PROCEDURE — 93005 ELECTROCARDIOGRAM TRACING: CPT

## 2020-07-27 PROCEDURE — 99284 EMERGENCY DEPT VISIT MOD MDM: CPT | Mod: 25

## 2020-07-27 PROCEDURE — 36415 COLL VENOUS BLD VENIPUNCTURE: CPT

## 2020-07-27 PROCEDURE — 93010 ELECTROCARDIOGRAM REPORT: CPT | Mod: ,,, | Performed by: INTERNAL MEDICINE

## 2020-07-27 RX ORDER — TOPIRAMATE 100 MG/1
300 TABLET, FILM COATED ORAL 2 TIMES DAILY
Qty: 300 TABLET | Refills: 11 | Status: SHIPPED | OUTPATIENT
Start: 2020-07-27 | End: 2021-08-06

## 2020-07-27 RX ORDER — TOPIRAMATE 100 MG/1
300 TABLET, FILM COATED ORAL 2 TIMES DAILY
Qty: 300 TABLET | Refills: 11 | Status: SHIPPED | OUTPATIENT
Start: 2020-07-27 | End: 2020-07-27 | Stop reason: SDUPTHER
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
## 2020-08-02 ENCOUNTER — HOSPITAL ENCOUNTER (EMERGENCY)
Facility: HOSPITAL | Age: 30
Discharge: HOME | End: 2020-08-03
Attending: EMERGENCY MEDICINE
Payer: MEDICAID

## 2020-08-02 VITALS
TEMPERATURE: 99 F | BODY MASS INDEX: 32.67 KG/M2 | HEIGHT: 62 IN | OXYGEN SATURATION: 97 % | DIASTOLIC BLOOD PRESSURE: 76 MMHG | RESPIRATION RATE: 18 BRPM | SYSTOLIC BLOOD PRESSURE: 136 MMHG | HEART RATE: 108 BPM | WEIGHT: 177.56 LBS

## 2020-08-02 DIAGNOSIS — R07.89 RIGHT-SIDED CHEST WALL PAIN: ICD-10-CM
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
## 2020-08-02 DIAGNOSIS — S80.01XA CONTUSION OF RIGHT KNEE, INITIAL ENCOUNTER: ICD-10-CM

## 2020-08-02 DIAGNOSIS — R10.9 ABDOMINAL PAIN: ICD-10-CM

## 2020-08-02 DIAGNOSIS — N93.9 VAGINAL BLEEDING: ICD-10-CM

## 2020-08-02 DIAGNOSIS — R10.11 ABDOMINAL PAIN, RUQ: ICD-10-CM

## 2020-08-02 LAB
ALBUMIN SERPL BCP-MCNC: 4 G/DL (ref 3.5–5.2)
ALP SERPL-CCNC: 172 U/L (ref 55–135)
ALT SERPL W/O P-5'-P-CCNC: 23 U/L (ref 10–44)
ANION GAP SERPL CALC-SCNC: 12 MMOL/L (ref 8–16)
ANISOCYTOSIS BLD QL SMEAR: SLIGHT
AST SERPL-CCNC: 27 U/L (ref 10–40)
BACTERIA #/AREA URNS AUTO: ABNORMAL /HPF
BASOPHILS # BLD AUTO: 0.07 K/UL (ref 0–0.2)
BASOPHILS NFR BLD: 0.6 % (ref 0–1.9)
BILIRUB SERPL-MCNC: 0.7 MG/DL (ref 0.1–1)
BILIRUB UR QL STRIP: NEGATIVE
BUN SERPL-MCNC: 13 MG/DL (ref 6–20)
CALCIUM SERPL-MCNC: 9 MG/DL (ref 8.7–10.5)
CHLORIDE SERPL-SCNC: 109 MMOL/L (ref 95–110)
CLARITY UR REFRACT.AUTO: CLEAR
CO2 SERPL-SCNC: 19 MMOL/L (ref 23–29)
COLOR UR AUTO: YELLOW
CREAT SERPL-MCNC: 1.2 MG/DL (ref 0.5–1.4)
DIFFERENTIAL METHOD: ABNORMAL
EOSINOPHIL # BLD AUTO: 0.1 K/UL (ref 0–0.5)
EOSINOPHIL NFR BLD: 1 % (ref 0–8)
ERYTHROCYTE [DISTWIDTH] IN BLOOD BY AUTOMATED COUNT: 13.2 % (ref 11.5–14.5)
EST. GFR  (AFRICAN AMERICAN): >60 ML/MIN/1.73 M^2
EST. GFR  (NON AFRICAN AMERICAN): >60 ML/MIN/1.73 M^2
GLUCOSE SERPL-MCNC: 109 MG/DL (ref 70–110)
GLUCOSE UR QL STRIP: NEGATIVE
HCT VFR BLD AUTO: 41.3 % (ref 37–48.5)
HGB BLD-MCNC: 14 G/DL (ref 12–16)
HGB UR QL STRIP: NEGATIVE
IMM GRANULOCYTES # BLD AUTO: 0.04 K/UL (ref 0–0.04)
IMM GRANULOCYTES NFR BLD AUTO: 0.3 % (ref 0–0.5)
KETONES UR QL STRIP: ABNORMAL
LEUKOCYTE ESTERASE UR QL STRIP: ABNORMAL
LYMPHOCYTES # BLD AUTO: 3.9 K/UL (ref 1–4.8)
LYMPHOCYTES NFR BLD: 32.7 % (ref 18–48)
MCH RBC QN AUTO: 31.5 PG (ref 27–31)
MCHC RBC AUTO-ENTMCNC: 33.9 G/DL (ref 32–36)
MCV RBC AUTO: 93 FL (ref 82–98)
MICROSCOPIC COMMENT: ABNORMAL
MONOCYTES # BLD AUTO: 0.9 K/UL (ref 0.3–1)
MONOCYTES NFR BLD: 7.3 % (ref 4–15)
NEUTROPHILS # BLD AUTO: 6.9 K/UL (ref 1.8–7.7)
NEUTROPHILS NFR BLD: 58.1 % (ref 38–73)
NITRITE UR QL STRIP: NEGATIVE
NRBC BLD-RTO: 0 /100 WBC
PH UR STRIP: 7 [PH] (ref 5–8)
PLATELET # BLD AUTO: 237 K/UL (ref 150–350)
PLATELET BLD QL SMEAR: ABNORMAL
PMV BLD AUTO: 10.4 FL (ref 9.2–12.9)
POIKILOCYTOSIS BLD QL SMEAR: SLIGHT
POTASSIUM SERPL-SCNC: 4.2 MMOL/L (ref 3.5–5.1)
PROT SERPL-MCNC: 7.5 G/DL (ref 6–8.4)
PROT UR QL STRIP: NEGATIVE
RBC # BLD AUTO: 4.45 M/UL (ref 4–5.4)
RBC #/AREA URNS AUTO: 0 /HPF (ref 0–4)
SODIUM SERPL-SCNC: 140 MMOL/L (ref 136–145)
SP GR UR STRIP: 1.01 (ref 1–1.03)
SQUAMOUS #/AREA URNS AUTO: 1 /HPF
URN SPEC COLLECT METH UR: ABNORMAL
UROBILINOGEN UR STRIP-ACNC: <2 EU/DL
WBC # BLD AUTO: 11.85 K/UL (ref 3.9–12.7)
WBC #/AREA URNS AUTO: 8 /HPF (ref 0–5)

## 2020-08-02 PROCEDURE — 86703 HIV-1/HIV-2 1 RESULT ANTBDY: CPT

## 2020-08-02 PROCEDURE — 80053 COMPREHEN METABOLIC PANEL: CPT | Mod: ER

## 2020-08-02 PROCEDURE — 85025 COMPLETE CBC W/AUTO DIFF WBC: CPT | Mod: ER

## 2020-08-02 PROCEDURE — 25500020 PHARM REV CODE 255: Mod: ER | Performed by: EMERGENCY MEDICINE

## 2020-08-02 PROCEDURE — 81000 URINALYSIS NONAUTO W/SCOPE: CPT | Mod: ER

## 2020-08-02 PROCEDURE — 99285 EMERGENCY DEPT VISIT HI MDM: CPT | Mod: 25,ER

## 2020-08-02 RX ORDER — IBUPROFEN 200 MG
800 TABLET ORAL EVERY 6 HOURS PRN
COMMUNITY

## 2020-08-02 RX ADMIN — IOHEXOL 75 ML: 350 INJECTION, SOLUTION INTRAVENOUS at 11:08

## 2020-08-03 LAB — HIV 1+2 AB+HIV1 P24 AG SERPL QL IA: NEGATIVE

## 2020-08-03 RX ORDER — NAPROXEN 500 MG/1
500 TABLET ORAL 2 TIMES DAILY WITH MEALS
Qty: 60 TABLET | Refills: 0 | Status: SHIPPED | OUTPATIENT
Start: 2020-08-03 | End: 2021-08-06
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
## 2021-01-02 ENCOUNTER — HOSPITAL ENCOUNTER (EMERGENCY)
Facility: HOSPITAL | Age: 31
Discharge: HOME | End: 2021-01-02
Attending: EMERGENCY MEDICINE
Payer: MEDICAID

## 2021-01-02 VITALS
RESPIRATION RATE: 16 BRPM | SYSTOLIC BLOOD PRESSURE: 125 MMHG | TEMPERATURE: 98 F | DIASTOLIC BLOOD PRESSURE: 84 MMHG | OXYGEN SATURATION: 100 % | HEART RATE: 92 BPM

## 2021-01-02 DIAGNOSIS — F41.9 ANXIETY: ICD-10-CM

## 2021-01-02 DIAGNOSIS — F10.920 ALCOHOLIC INTOXICATION WITHOUT COMPLICATION: Primary | ICD-10-CM

## 2021-01-02 LAB
ALBUMIN SERPL BCP-MCNC: 4 G/DL (ref 3.5–5.2)
ALP SERPL-CCNC: 164 U/L (ref 55–135)
ALT SERPL W/O P-5'-P-CCNC: 35 U/L (ref 10–44)
ANION GAP SERPL CALC-SCNC: 8 MMOL/L (ref 8–16)
AST SERPL-CCNC: 21 U/L (ref 10–40)
BASOPHILS # BLD AUTO: 0.07 K/UL (ref 0–0.2)
BASOPHILS NFR BLD: 0.5 % (ref 0–1.9)
BILIRUB SERPL-MCNC: 0.3 MG/DL (ref 0.1–1)
BUN SERPL-MCNC: 16 MG/DL (ref 6–20)
CALCIUM SERPL-MCNC: 8.9 MG/DL (ref 8.7–10.5)
CHLORIDE SERPL-SCNC: 111 MMOL/L (ref 95–110)
CO2 SERPL-SCNC: 26 MMOL/L (ref 23–29)
CREAT SERPL-MCNC: 1.2 MG/DL (ref 0.5–1.4)
DIFFERENTIAL METHOD: ABNORMAL
EOSINOPHIL # BLD AUTO: 0.1 K/UL (ref 0–0.5)
EOSINOPHIL NFR BLD: 0.5 % (ref 0–8)
ERYTHROCYTE [DISTWIDTH] IN BLOOD BY AUTOMATED COUNT: 13.2 % (ref 11.5–14.5)
EST. GFR  (AFRICAN AMERICAN): >60 ML/MIN/1.73 M^2
EST. GFR  (NON AFRICAN AMERICAN): >60 ML/MIN/1.73 M^2
ETHANOL SERPL-MCNC: 223 MG/DL
GLUCOSE SERPL-MCNC: 97 MG/DL (ref 70–110)
HCT VFR BLD AUTO: 44.7 % (ref 37–48.5)
HGB BLD-MCNC: 14.8 G/DL (ref 12–16)
IMM GRANULOCYTES # BLD AUTO: 0.06 K/UL (ref 0–0.04)
IMM GRANULOCYTES NFR BLD AUTO: 0.4 % (ref 0–0.5)
LYMPHOCYTES # BLD AUTO: 3.6 K/UL (ref 1–4.8)
LYMPHOCYTES NFR BLD: 26.3 % (ref 18–48)
MCH RBC QN AUTO: 31.4 PG (ref 27–31)
MCHC RBC AUTO-ENTMCNC: 33.1 G/DL (ref 32–36)
MCV RBC AUTO: 95 FL (ref 82–98)
MONOCYTES # BLD AUTO: 1 K/UL (ref 0.3–1)
MONOCYTES NFR BLD: 6.9 % (ref 4–15)
NEUTROPHILS # BLD AUTO: 9 K/UL (ref 1.8–7.7)
NEUTROPHILS NFR BLD: 65.4 % (ref 38–73)
NRBC BLD-RTO: 0 /100 WBC
PLATELET # BLD AUTO: 227 K/UL (ref 150–350)
PMV BLD AUTO: 9.8 FL (ref 9.2–12.9)
POTASSIUM SERPL-SCNC: 3.7 MMOL/L (ref 3.5–5.1)
PROT SERPL-MCNC: 7.5 G/DL (ref 6–8.4)
RBC # BLD AUTO: 4.71 M/UL (ref 4–5.4)
SODIUM SERPL-SCNC: 145 MMOL/L (ref 136–145)
WBC # BLD AUTO: 13.84 K/UL (ref 3.9–12.7)

## 2021-01-02 PROCEDURE — 96374 THER/PROPH/DIAG INJ IV PUSH: CPT

## 2021-01-02 PROCEDURE — 82077 ASSAY SPEC XCP UR&BREATH IA: CPT

## 2021-01-02 PROCEDURE — 80053 COMPREHEN METABOLIC PANEL: CPT

## 2021-01-02 PROCEDURE — 85025 COMPLETE CBC W/AUTO DIFF WBC: CPT

## 2021-01-02 PROCEDURE — 99284 EMERGENCY DEPT VISIT MOD MDM: CPT | Mod: 25

## 2021-01-02 PROCEDURE — 25000003 PHARM REV CODE 250: Performed by: EMERGENCY MEDICINE

## 2021-01-02 PROCEDURE — 36415 COLL VENOUS BLD VENIPUNCTURE: CPT

## 2021-01-02 PROCEDURE — 63600175 PHARM REV CODE 636 W HCPCS: Performed by: EMERGENCY MEDICINE

## 2021-01-02 RX ORDER — KETOROLAC TROMETHAMINE 30 MG/ML
15 INJECTION, SOLUTION INTRAMUSCULAR; INTRAVENOUS
Status: DISCONTINUED | OUTPATIENT
Start: 2021-01-02 | End: 2021-01-02 | Stop reason: HOSPADM

## 2021-01-02 RX ORDER — ONDANSETRON 2 MG/ML
4 INJECTION INTRAMUSCULAR; INTRAVENOUS
Status: COMPLETED | OUTPATIENT
Start: 2021-01-02 | End: 2021-01-02

## 2021-01-02 RX ADMIN — SODIUM CHLORIDE 1000 ML: 0.9 INJECTION, SOLUTION INTRAVENOUS at 12:01

## 2021-01-02 RX ADMIN — ONDANSETRON HYDROCHLORIDE 4 MG: 2 SOLUTION INTRAMUSCULAR; INTRAVENOUS at 12:01

## 2021-03-25 ENCOUNTER — HOSPITAL ENCOUNTER (EMERGENCY)
Facility: HOSPITAL | Age: 31
Discharge: HOME | End: 2021-03-25
Attending: EMERGENCY MEDICINE
Payer: MEDICAID

## 2021-03-25 VITALS
OXYGEN SATURATION: 99 % | TEMPERATURE: 99 F | BODY MASS INDEX: 32.94 KG/M2 | RESPIRATION RATE: 18 BRPM | HEIGHT: 62 IN | SYSTOLIC BLOOD PRESSURE: 111 MMHG | WEIGHT: 179 LBS | HEART RATE: 97 BPM | DIASTOLIC BLOOD PRESSURE: 77 MMHG

## 2021-03-25 DIAGNOSIS — F32.A DEPRESSION, UNSPECIFIED DEPRESSION TYPE: ICD-10-CM

## 2021-03-25 DIAGNOSIS — F10.920 ACUTE ALCOHOLIC INTOXICATION WITHOUT COMPLICATION: Primary | ICD-10-CM

## 2021-03-25 PROCEDURE — 99282 EMERGENCY DEPT VISIT SF MDM: CPT

## 2021-04-09 ENCOUNTER — HOSPITAL ENCOUNTER (EMERGENCY)
Facility: HOSPITAL | Age: 31
Discharge: HOME | End: 2021-04-09
Attending: FAMILY MEDICINE
Payer: MEDICAID

## 2021-04-09 VITALS
HEART RATE: 100 BPM | DIASTOLIC BLOOD PRESSURE: 74 MMHG | HEIGHT: 62 IN | WEIGHT: 169 LBS | SYSTOLIC BLOOD PRESSURE: 127 MMHG | BODY MASS INDEX: 31.1 KG/M2 | TEMPERATURE: 98 F | RESPIRATION RATE: 18 BRPM | OXYGEN SATURATION: 100 %

## 2021-04-09 DIAGNOSIS — R11.2 NAUSEA AND VOMITING, INTRACTABILITY OF VOMITING NOT SPECIFIED, UNSPECIFIED VOMITING TYPE: Primary | ICD-10-CM

## 2021-04-09 DIAGNOSIS — R10.11 RIGHT UPPER QUADRANT ABDOMINAL PAIN: ICD-10-CM

## 2021-04-09 LAB
ALBUMIN SERPL BCP-MCNC: 3.7 G/DL (ref 3.5–5.2)
ALP SERPL-CCNC: 181 U/L (ref 55–135)
ALT SERPL W/O P-5'-P-CCNC: 23 U/L (ref 10–44)
ANION GAP SERPL CALC-SCNC: 10 MMOL/L (ref 8–16)
AST SERPL-CCNC: 14 U/L (ref 10–40)
BASOPHILS # BLD AUTO: 0.06 K/UL (ref 0–0.2)
BASOPHILS NFR BLD: 0.5 % (ref 0–1.9)
BILIRUB SERPL-MCNC: 0.5 MG/DL (ref 0.1–1)
BUN SERPL-MCNC: 11 MG/DL (ref 6–20)
CALCIUM SERPL-MCNC: 8.9 MG/DL (ref 8.7–10.5)
CHLORIDE SERPL-SCNC: 103 MMOL/L (ref 95–110)
CO2 SERPL-SCNC: 27 MMOL/L (ref 23–29)
CREAT SERPL-MCNC: 1.4 MG/DL (ref 0.5–1.4)
DIFFERENTIAL METHOD: ABNORMAL
EOSINOPHIL # BLD AUTO: 0.1 K/UL (ref 0–0.5)
EOSINOPHIL NFR BLD: 0.4 % (ref 0–8)
ERYTHROCYTE [DISTWIDTH] IN BLOOD BY AUTOMATED COUNT: 13.5 % (ref 11.5–14.5)
EST. GFR  (AFRICAN AMERICAN): 58.2 ML/MIN/1.73 M^2
EST. GFR  (NON AFRICAN AMERICAN): 50.5 ML/MIN/1.73 M^2
GLUCOSE SERPL-MCNC: 125 MG/DL (ref 70–110)
HCT VFR BLD AUTO: 41.2 % (ref 37–48.5)
HGB BLD-MCNC: 13.8 G/DL (ref 12–16)
IMM GRANULOCYTES # BLD AUTO: 0.05 K/UL (ref 0–0.04)
IMM GRANULOCYTES NFR BLD AUTO: 0.4 % (ref 0–0.5)
LIPASE SERPL-CCNC: 209 U/L (ref 23–300)
LYMPHOCYTES # BLD AUTO: 2.9 K/UL (ref 1–4.8)
LYMPHOCYTES NFR BLD: 22.2 % (ref 18–48)
MCH RBC QN AUTO: 31.4 PG (ref 27–31)
MCHC RBC AUTO-ENTMCNC: 33.5 G/DL (ref 32–36)
MCV RBC AUTO: 94 FL (ref 82–98)
MONOCYTES # BLD AUTO: 0.8 K/UL (ref 0.3–1)
MONOCYTES NFR BLD: 6.2 % (ref 4–15)
NEUTROPHILS # BLD AUTO: 9.1 K/UL (ref 1.8–7.7)
NEUTROPHILS NFR BLD: 70.3 % (ref 38–73)
NRBC BLD-RTO: 0 /100 WBC
PLATELET # BLD AUTO: 274 K/UL (ref 150–450)
PMV BLD AUTO: 9.5 FL (ref 9.2–12.9)
POTASSIUM SERPL-SCNC: 3.8 MMOL/L (ref 3.5–5.1)
PROT SERPL-MCNC: 7.4 G/DL (ref 6–8.4)
RBC # BLD AUTO: 4.39 M/UL (ref 4–5.4)
SODIUM SERPL-SCNC: 140 MMOL/L (ref 136–145)
WBC # BLD AUTO: 12.98 K/UL (ref 3.9–12.7)

## 2021-04-09 PROCEDURE — 96365 THER/PROPH/DIAG IV INF INIT: CPT

## 2021-04-09 PROCEDURE — 36415 COLL VENOUS BLD VENIPUNCTURE: CPT | Performed by: FAMILY MEDICINE

## 2021-04-09 PROCEDURE — 25000003 PHARM REV CODE 250: Performed by: FAMILY MEDICINE

## 2021-04-09 PROCEDURE — 85025 COMPLETE CBC W/AUTO DIFF WBC: CPT | Performed by: FAMILY MEDICINE

## 2021-04-09 PROCEDURE — 96375 TX/PRO/DX INJ NEW DRUG ADDON: CPT

## 2021-04-09 PROCEDURE — 25500020 PHARM REV CODE 255: Performed by: FAMILY MEDICINE

## 2021-04-09 PROCEDURE — 83690 ASSAY OF LIPASE: CPT | Performed by: FAMILY MEDICINE

## 2021-04-09 PROCEDURE — 80053 COMPREHEN METABOLIC PANEL: CPT | Performed by: FAMILY MEDICINE

## 2021-04-09 PROCEDURE — 99285 EMERGENCY DEPT VISIT HI MDM: CPT | Mod: 25

## 2021-04-09 PROCEDURE — 63600175 PHARM REV CODE 636 W HCPCS: Performed by: FAMILY MEDICINE

## 2021-04-09 PROCEDURE — 96361 HYDRATE IV INFUSION ADD-ON: CPT

## 2021-04-09 RX ORDER — SODIUM CHLORIDE 9 MG/ML
INJECTION, SOLUTION INTRAVENOUS
Status: COMPLETED | OUTPATIENT
Start: 2021-04-09 | End: 2021-04-09

## 2021-04-09 RX ORDER — CIPROFLOXACIN 500 MG/1
500 TABLET ORAL
COMMUNITY
End: 2021-08-06

## 2021-04-09 RX ORDER — IBUPROFEN 800 MG/1
800 TABLET ORAL 3 TIMES DAILY
COMMUNITY

## 2021-04-09 RX ORDER — ONDANSETRON 8 MG/1
8 TABLET, ORALLY DISINTEGRATING ORAL 3 TIMES DAILY PRN
COMMUNITY
End: 2021-08-06

## 2021-04-09 RX ORDER — TAMSULOSIN HYDROCHLORIDE 0.4 MG/1
0.4 CAPSULE ORAL DAILY
COMMUNITY
End: 2021-08-06

## 2021-04-09 RX ORDER — QUETIAPINE FUMARATE 50 MG/1
50 TABLET, FILM COATED ORAL NIGHTLY
COMMUNITY
End: 2021-08-06

## 2021-04-09 RX ORDER — MORPHINE SULFATE 4 MG/ML
4 INJECTION, SOLUTION INTRAMUSCULAR; INTRAVENOUS
Status: COMPLETED | OUTPATIENT
Start: 2021-04-09 | End: 2021-04-09

## 2021-04-09 RX ORDER — ONDANSETRON 2 MG/ML
4 INJECTION INTRAMUSCULAR; INTRAVENOUS
Status: COMPLETED | OUTPATIENT
Start: 2021-04-09 | End: 2021-04-09

## 2021-04-09 RX ADMIN — IOHEXOL 100 ML: 350 INJECTION, SOLUTION INTRAVENOUS at 05:04

## 2021-04-09 RX ADMIN — PROMETHAZINE HYDROCHLORIDE 6.25 MG: 25 INJECTION INTRAMUSCULAR; INTRAVENOUS at 06:04

## 2021-04-09 RX ADMIN — MORPHINE SULFATE 4 MG: 4 INJECTION, SOLUTION INTRAMUSCULAR; INTRAVENOUS at 04:04

## 2021-04-09 RX ADMIN — ONDANSETRON 4 MG: 2 INJECTION INTRAMUSCULAR; INTRAVENOUS at 04:04

## 2021-04-09 RX ADMIN — SODIUM CHLORIDE: 0.9 INJECTION, SOLUTION INTRAVENOUS at 04:04

## 2021-05-20 ENCOUNTER — HOSPITAL ENCOUNTER (EMERGENCY)
Facility: HOSPITAL | Age: 31
Discharge: HOME | End: 2021-05-20
Attending: EMERGENCY MEDICINE
Payer: MEDICAID

## 2021-05-20 VITALS
TEMPERATURE: 98 F | SYSTOLIC BLOOD PRESSURE: 122 MMHG | OXYGEN SATURATION: 100 % | DIASTOLIC BLOOD PRESSURE: 81 MMHG | RESPIRATION RATE: 18 BRPM | HEART RATE: 100 BPM

## 2021-05-20 DIAGNOSIS — F10.920 ALCOHOLIC INTOXICATION WITHOUT COMPLICATION: Primary | ICD-10-CM

## 2021-05-20 DIAGNOSIS — R56.9 WITNESSED SEIZURE-LIKE ACTIVITY: ICD-10-CM

## 2021-05-20 LAB
ALBUMIN SERPL BCP-MCNC: 4.2 G/DL (ref 3.5–5.2)
ALP SERPL-CCNC: 141 U/L (ref 55–135)
ALT SERPL W/O P-5'-P-CCNC: 28 U/L (ref 10–44)
AMPHET+METHAMPHET UR QL: NEGATIVE
ANION GAP SERPL CALC-SCNC: 12 MMOL/L (ref 8–16)
AST SERPL-CCNC: 19 U/L (ref 10–40)
B-HCG UR QL: NEGATIVE
BARBITURATES UR QL SCN>200 NG/ML: NEGATIVE
BASOPHILS # BLD AUTO: 0.06 K/UL (ref 0–0.2)
BASOPHILS NFR BLD: 0.3 % (ref 0–1.9)
BENZODIAZ UR QL SCN>200 NG/ML: NEGATIVE
BILIRUB SERPL-MCNC: 0.3 MG/DL (ref 0.1–1)
BILIRUB UR QL STRIP: NEGATIVE
BUN SERPL-MCNC: 11 MG/DL (ref 6–20)
BZE UR QL SCN: NEGATIVE
CALCIUM SERPL-MCNC: 9.7 MG/DL (ref 8.7–10.5)
CANNABINOIDS UR QL SCN: NEGATIVE
CHLORIDE SERPL-SCNC: 111 MMOL/L (ref 95–110)
CLARITY UR: CLEAR
CO2 SERPL-SCNC: 20 MMOL/L (ref 23–29)
COLOR UR: YELLOW
CREAT SERPL-MCNC: 1.2 MG/DL (ref 0.5–1.4)
CREAT UR-MCNC: <13 MG/DL (ref 15–325)
DIFFERENTIAL METHOD: ABNORMAL
EOSINOPHIL # BLD AUTO: 0 K/UL (ref 0–0.5)
EOSINOPHIL NFR BLD: 0.2 % (ref 0–8)
ERYTHROCYTE [DISTWIDTH] IN BLOOD BY AUTOMATED COUNT: 12.9 % (ref 11.5–14.5)
EST. GFR  (AFRICAN AMERICAN): >60 ML/MIN/1.73 M^2
EST. GFR  (NON AFRICAN AMERICAN): >60 ML/MIN/1.73 M^2
ETHANOL SERPL-MCNC: 200 MG/DL
GLUCOSE SERPL-MCNC: 114 MG/DL (ref 70–110)
GLUCOSE UR QL STRIP: NEGATIVE
HCT VFR BLD AUTO: 42 % (ref 37–48.5)
HGB BLD-MCNC: 14.5 G/DL (ref 12–16)
HGB UR QL STRIP: NEGATIVE
IMM GRANULOCYTES # BLD AUTO: 0.07 K/UL (ref 0–0.04)
IMM GRANULOCYTES NFR BLD AUTO: 0.4 % (ref 0–0.5)
KETONES UR QL STRIP: NEGATIVE
LEUKOCYTE ESTERASE UR QL STRIP: NEGATIVE
LYMPHOCYTES # BLD AUTO: 4.7 K/UL (ref 1–4.8)
LYMPHOCYTES NFR BLD: 27.5 % (ref 18–48)
MCH RBC QN AUTO: 31 PG (ref 27–31)
MCHC RBC AUTO-ENTMCNC: 34.5 G/DL (ref 32–36)
MCV RBC AUTO: 90 FL (ref 82–98)
METHADONE UR QL SCN>300 NG/ML: NEGATIVE
MONOCYTES # BLD AUTO: 1.1 K/UL (ref 0.3–1)
MONOCYTES NFR BLD: 6.5 % (ref 4–15)
NEUTROPHILS # BLD AUTO: 11.2 K/UL (ref 1.8–7.7)
NEUTROPHILS NFR BLD: 65.1 % (ref 38–73)
NITRITE UR QL STRIP: NEGATIVE
NRBC BLD-RTO: 0 /100 WBC
OPIATES UR QL SCN: NEGATIVE
PCP UR QL SCN>25 NG/ML: NEGATIVE
PH UR STRIP: 5 [PH] (ref 5–8)
PLATELET # BLD AUTO: 262 K/UL (ref 150–450)
PMV BLD AUTO: 9.4 FL (ref 9.2–12.9)
POTASSIUM SERPL-SCNC: 3.7 MMOL/L (ref 3.5–5.1)
PROT SERPL-MCNC: 8.1 G/DL (ref 6–8.4)
PROT UR QL STRIP: NEGATIVE
RBC # BLD AUTO: 4.68 M/UL (ref 4–5.4)
SODIUM SERPL-SCNC: 143 MMOL/L (ref 136–145)
SP GR UR STRIP: <=1.005 (ref 1–1.03)
TOXICOLOGY INFORMATION: ABNORMAL
URN SPEC COLLECT METH UR: ABNORMAL
UROBILINOGEN UR STRIP-ACNC: NEGATIVE EU/DL
WBC # BLD AUTO: 17.18 K/UL (ref 3.9–12.7)

## 2021-05-20 PROCEDURE — 96374 THER/PROPH/DIAG INJ IV PUSH: CPT

## 2021-05-20 PROCEDURE — 81003 URINALYSIS AUTO W/O SCOPE: CPT | Mod: 59 | Performed by: EMERGENCY MEDICINE

## 2021-05-20 PROCEDURE — 85025 COMPLETE CBC W/AUTO DIFF WBC: CPT | Performed by: EMERGENCY MEDICINE

## 2021-05-20 PROCEDURE — 81025 URINE PREGNANCY TEST: CPT | Performed by: EMERGENCY MEDICINE

## 2021-05-20 PROCEDURE — 80053 COMPREHEN METABOLIC PANEL: CPT | Performed by: EMERGENCY MEDICINE

## 2021-05-20 PROCEDURE — 80307 DRUG TEST PRSMV CHEM ANLYZR: CPT | Performed by: EMERGENCY MEDICINE

## 2021-05-20 PROCEDURE — 25000003 PHARM REV CODE 250: Performed by: EMERGENCY MEDICINE

## 2021-05-20 PROCEDURE — 63600175 PHARM REV CODE 636 W HCPCS: Performed by: EMERGENCY MEDICINE

## 2021-05-20 PROCEDURE — 99284 EMERGENCY DEPT VISIT MOD MDM: CPT | Mod: 25

## 2021-05-20 PROCEDURE — 36415 COLL VENOUS BLD VENIPUNCTURE: CPT | Performed by: EMERGENCY MEDICINE

## 2021-05-20 PROCEDURE — 96361 HYDRATE IV INFUSION ADD-ON: CPT

## 2021-05-20 PROCEDURE — 82077 ASSAY SPEC XCP UR&BREATH IA: CPT | Performed by: EMERGENCY MEDICINE

## 2021-05-20 RX ORDER — ALPRAZOLAM 0.5 MG/1
1 TABLET ORAL
Status: COMPLETED | OUTPATIENT
Start: 2021-05-20 | End: 2021-05-20

## 2021-05-20 RX ORDER — LORAZEPAM 2 MG/ML
1 INJECTION INTRAMUSCULAR
Status: DISCONTINUED | OUTPATIENT
Start: 2021-05-20 | End: 2021-05-20

## 2021-05-20 RX ORDER — ONDANSETRON 2 MG/ML
4 INJECTION INTRAMUSCULAR; INTRAVENOUS
Status: COMPLETED | OUTPATIENT
Start: 2021-05-20 | End: 2021-05-20

## 2021-05-20 RX ORDER — ONDANSETRON 4 MG/1
4 TABLET, FILM COATED ORAL EVERY 6 HOURS
Qty: 12 TABLET | Refills: 0 | Status: SHIPPED | OUTPATIENT
Start: 2021-05-20 | End: 2021-08-06

## 2021-05-20 RX ADMIN — SODIUM CHLORIDE 1000 ML: 0.9 INJECTION, SOLUTION INTRAVENOUS at 01:05

## 2021-05-20 RX ADMIN — ALPRAZOLAM 1 MG: 0.5 TABLET ORAL at 02:05

## 2021-05-20 RX ADMIN — ONDANSETRON 4 MG: 2 INJECTION INTRAMUSCULAR; INTRAVENOUS at 01:05

## 2021-06-09 ENCOUNTER — HOSPITAL ENCOUNTER (EMERGENCY)
Facility: HOSPITAL | Age: 31
Discharge: HOME | End: 2021-06-09
Attending: EMERGENCY MEDICINE
Payer: MEDICAID

## 2021-06-09 VITALS
WEIGHT: 174 LBS | SYSTOLIC BLOOD PRESSURE: 126 MMHG | BODY MASS INDEX: 32.02 KG/M2 | RESPIRATION RATE: 16 BRPM | DIASTOLIC BLOOD PRESSURE: 86 MMHG | HEIGHT: 62 IN | TEMPERATURE: 99 F | HEART RATE: 82 BPM | OXYGEN SATURATION: 100 %

## 2021-06-09 DIAGNOSIS — R10.11 RIGHT UPPER QUADRANT ABDOMINAL PAIN: Primary | ICD-10-CM

## 2021-06-09 DIAGNOSIS — R11.0 NAUSEA: ICD-10-CM

## 2021-06-09 LAB
ALBUMIN SERPL BCP-MCNC: 3.8 G/DL (ref 3.5–5.2)
ALP SERPL-CCNC: 133 U/L (ref 55–135)
ALT SERPL W/O P-5'-P-CCNC: 24 U/L (ref 10–44)
ANION GAP SERPL CALC-SCNC: 6 MMOL/L (ref 8–16)
AST SERPL-CCNC: 15 U/L (ref 10–40)
BASOPHILS # BLD AUTO: 0.06 K/UL (ref 0–0.2)
BASOPHILS NFR BLD: 0.5 % (ref 0–1.9)
BILIRUB SERPL-MCNC: 0.5 MG/DL (ref 0.1–1)
BUN SERPL-MCNC: 14 MG/DL (ref 6–20)
CALCIUM SERPL-MCNC: 9.7 MG/DL (ref 8.7–10.5)
CHLORIDE SERPL-SCNC: 110 MMOL/L (ref 95–110)
CO2 SERPL-SCNC: 27 MMOL/L (ref 23–29)
CREAT SERPL-MCNC: 0.9 MG/DL (ref 0.5–1.4)
DIFFERENTIAL METHOD: ABNORMAL
EOSINOPHIL # BLD AUTO: 0 K/UL (ref 0–0.5)
EOSINOPHIL NFR BLD: 0.3 % (ref 0–8)
ERYTHROCYTE [DISTWIDTH] IN BLOOD BY AUTOMATED COUNT: 12.8 % (ref 11.5–14.5)
EST. GFR  (AFRICAN AMERICAN): >60 ML/MIN/1.73 M^2
EST. GFR  (NON AFRICAN AMERICAN): >60 ML/MIN/1.73 M^2
GLUCOSE SERPL-MCNC: 91 MG/DL (ref 70–110)
HCT VFR BLD AUTO: 41.1 % (ref 37–48.5)
HGB BLD-MCNC: 14 G/DL (ref 12–16)
IMM GRANULOCYTES # BLD AUTO: 0.04 K/UL (ref 0–0.04)
IMM GRANULOCYTES NFR BLD AUTO: 0.3 % (ref 0–0.5)
LYMPHOCYTES # BLD AUTO: 3.4 K/UL (ref 1–4.8)
LYMPHOCYTES NFR BLD: 26.9 % (ref 18–48)
MCH RBC QN AUTO: 31.3 PG (ref 27–31)
MCHC RBC AUTO-ENTMCNC: 34.1 G/DL (ref 32–36)
MCV RBC AUTO: 92 FL (ref 82–98)
MONOCYTES # BLD AUTO: 0.8 K/UL (ref 0.3–1)
MONOCYTES NFR BLD: 6.3 % (ref 4–15)
NEUTROPHILS # BLD AUTO: 8.3 K/UL (ref 1.8–7.7)
NEUTROPHILS NFR BLD: 65.7 % (ref 38–73)
NRBC BLD-RTO: 0 /100 WBC
PLATELET # BLD AUTO: 229 K/UL (ref 150–450)
PMV BLD AUTO: 9.6 FL (ref 9.2–12.9)
POTASSIUM SERPL-SCNC: 4.6 MMOL/L (ref 3.5–5.1)
PROT SERPL-MCNC: 7.3 G/DL (ref 6–8.4)
RBC # BLD AUTO: 4.47 M/UL (ref 4–5.4)
SODIUM SERPL-SCNC: 143 MMOL/L (ref 136–145)
WBC # BLD AUTO: 12.64 K/UL (ref 3.9–12.7)

## 2021-06-09 PROCEDURE — 36415 COLL VENOUS BLD VENIPUNCTURE: CPT | Performed by: NURSE PRACTITIONER

## 2021-06-09 PROCEDURE — 85025 COMPLETE CBC W/AUTO DIFF WBC: CPT | Performed by: NURSE PRACTITIONER

## 2021-06-09 PROCEDURE — 99283 EMERGENCY DEPT VISIT LOW MDM: CPT

## 2021-06-09 PROCEDURE — 25000003 PHARM REV CODE 250: Performed by: NURSE PRACTITIONER

## 2021-06-09 PROCEDURE — 80053 COMPREHEN METABOLIC PANEL: CPT | Performed by: NURSE PRACTITIONER

## 2021-06-09 RX ORDER — ONDANSETRON 4 MG/1
4 TABLET, ORALLY DISINTEGRATING ORAL
Status: COMPLETED | OUTPATIENT
Start: 2021-06-09 | End: 2021-06-09

## 2021-06-09 RX ORDER — PANTOPRAZOLE SODIUM 40 MG/1
40 TABLET, DELAYED RELEASE ORAL DAILY
Qty: 10 TABLET | Refills: 0 | Status: SHIPPED | OUTPATIENT
Start: 2021-06-09 | End: 2021-08-06

## 2021-06-09 RX ORDER — PANTOPRAZOLE SODIUM 40 MG/1
40 TABLET, DELAYED RELEASE ORAL
Status: COMPLETED | OUTPATIENT
Start: 2021-06-09 | End: 2021-06-09

## 2021-06-09 RX ADMIN — ONDANSETRON 4 MG: 4 TABLET, ORALLY DISINTEGRATING ORAL at 12:06

## 2021-06-09 RX ADMIN — LIDOCAINE HYDROCHLORIDE: 20 SOLUTION ORAL; TOPICAL at 12:06

## 2021-06-09 RX ADMIN — PANTOPRAZOLE SODIUM 40 MG: 40 TABLET, DELAYED RELEASE ORAL at 12:06

## 2021-08-06 ENCOUNTER — HOSPITAL ENCOUNTER (EMERGENCY)
Facility: HOSPITAL | Age: 31
Discharge: HOME | End: 2021-08-06
Attending: EMERGENCY MEDICINE
Payer: MEDICAID

## 2021-08-06 VITALS
RESPIRATION RATE: 18 BRPM | HEART RATE: 91 BPM | OXYGEN SATURATION: 99 % | TEMPERATURE: 98 F | DIASTOLIC BLOOD PRESSURE: 84 MMHG | SYSTOLIC BLOOD PRESSURE: 133 MMHG

## 2021-08-06 DIAGNOSIS — K11.20 SIALOADENITIS OF SUBMANDIBULAR GLAND: Primary | ICD-10-CM

## 2021-08-06 PROCEDURE — 25000003 PHARM REV CODE 250: Performed by: EMERGENCY MEDICINE

## 2021-08-06 PROCEDURE — 99283 EMERGENCY DEPT VISIT LOW MDM: CPT

## 2021-08-06 RX ORDER — CIPROFLOXACIN 500 MG/1
500 TABLET ORAL
Status: COMPLETED | OUTPATIENT
Start: 2021-08-06 | End: 2021-08-06

## 2021-08-06 RX ORDER — CIPROFLOXACIN 500 MG/1
500 TABLET ORAL 2 TIMES DAILY
Qty: 20 TABLET | Refills: 0 | Status: SHIPPED | OUTPATIENT
Start: 2021-08-06 | End: 2021-08-16

## 2021-08-06 RX ORDER — CEPHALEXIN 250 MG/1
500 CAPSULE ORAL
Status: DISCONTINUED | OUTPATIENT
Start: 2021-08-06 | End: 2021-08-06

## 2021-08-06 RX ORDER — TOPIRAMATE 100 MG/1
TABLET, FILM COATED ORAL
COMMUNITY

## 2021-08-06 RX ADMIN — CIPROFLOXACIN 500 MG: 500 TABLET, FILM COATED ORAL at 09:08

## 2022-02-05 ENCOUNTER — HOSPITAL ENCOUNTER (EMERGENCY)
Facility: HOSPITAL | Age: 32
Discharge: PSYCHIATRIC OR SUBSTANCE USE FACILITY | End: 2022-02-05
Attending: STUDENT IN AN ORGANIZED HEALTH CARE EDUCATION/TRAINING PROGRAM
Payer: MEDICAID

## 2022-02-05 VITALS
DIASTOLIC BLOOD PRESSURE: 65 MMHG | SYSTOLIC BLOOD PRESSURE: 115 MMHG | RESPIRATION RATE: 18 BRPM | OXYGEN SATURATION: 97 % | TEMPERATURE: 99 F | HEART RATE: 102 BPM

## 2022-02-05 DIAGNOSIS — R45.851 SUICIDAL IDEATION: Primary | ICD-10-CM

## 2022-02-05 LAB
ALBUMIN SERPL BCP-MCNC: 3.9 G/DL (ref 3.5–5.2)
ALP SERPL-CCNC: 162 U/L (ref 55–135)
ALT SERPL W/O P-5'-P-CCNC: 36 U/L (ref 10–44)
AMPHET+METHAMPHET UR QL: ABNORMAL
ANION GAP SERPL CALC-SCNC: 5 MMOL/L (ref 8–16)
APAP SERPL-MCNC: <2 UG/ML (ref 10–20)
AST SERPL-CCNC: 23 U/L (ref 10–40)
B-HCG UR QL: NEGATIVE
BARBITURATES UR QL SCN>200 NG/ML: NEGATIVE
BASOPHILS # BLD AUTO: 0.05 K/UL (ref 0–0.2)
BASOPHILS NFR BLD: 0.4 % (ref 0–1.9)
BENZODIAZ UR QL SCN>200 NG/ML: NEGATIVE
BILIRUB SERPL-MCNC: 0.5 MG/DL (ref 0.1–1)
BILIRUB UR QL STRIP: NEGATIVE
BUN SERPL-MCNC: 12 MG/DL (ref 6–20)
BZE UR QL SCN: NEGATIVE
CALCIUM SERPL-MCNC: 8.6 MG/DL (ref 8.7–10.5)
CANNABINOIDS UR QL SCN: NEGATIVE
CHLORIDE SERPL-SCNC: 108 MMOL/L (ref 95–110)
CLARITY UR: CLEAR
CO2 SERPL-SCNC: 28 MMOL/L (ref 23–29)
COLOR UR: YELLOW
CREAT SERPL-MCNC: 0.9 MG/DL (ref 0.5–1.4)
CREAT UR-MCNC: 108 MG/DL (ref 15–325)
DIFFERENTIAL METHOD: ABNORMAL
EOSINOPHIL # BLD AUTO: 0.1 K/UL (ref 0–0.5)
EOSINOPHIL NFR BLD: 0.4 % (ref 0–8)
ERYTHROCYTE [DISTWIDTH] IN BLOOD BY AUTOMATED COUNT: 12.3 % (ref 11.5–14.5)
EST. GFR  (AFRICAN AMERICAN): >60 ML/MIN/1.73 M^2
EST. GFR  (NON AFRICAN AMERICAN): >60 ML/MIN/1.73 M^2
ETHANOL SERPL-MCNC: <3 MG/DL
GLUCOSE SERPL-MCNC: 97 MG/DL (ref 70–110)
GLUCOSE UR QL STRIP: NEGATIVE
HCT VFR BLD AUTO: 41.1 % (ref 37–48.5)
HGB BLD-MCNC: 14.5 G/DL (ref 12–16)
HGB UR QL STRIP: NEGATIVE
IMM GRANULOCYTES # BLD AUTO: 0.05 K/UL (ref 0–0.04)
IMM GRANULOCYTES NFR BLD AUTO: 0.4 % (ref 0–0.5)
KETONES UR QL STRIP: NEGATIVE
LEUKOCYTE ESTERASE UR QL STRIP: NEGATIVE
LYMPHOCYTES # BLD AUTO: 3.2 K/UL (ref 1–4.8)
LYMPHOCYTES NFR BLD: 25.5 % (ref 18–48)
MCH RBC QN AUTO: 31.9 PG (ref 27–31)
MCHC RBC AUTO-ENTMCNC: 35.3 G/DL (ref 32–36)
MCV RBC AUTO: 90 FL (ref 82–98)
METHADONE UR QL SCN>300 NG/ML: NEGATIVE
MONOCYTES # BLD AUTO: 0.9 K/UL (ref 0.3–1)
MONOCYTES NFR BLD: 6.9 % (ref 4–15)
NEUTROPHILS # BLD AUTO: 8.3 K/UL (ref 1.8–7.7)
NEUTROPHILS NFR BLD: 66.4 % (ref 38–73)
NITRITE UR QL STRIP: NEGATIVE
NRBC BLD-RTO: 0 /100 WBC
OPIATES UR QL SCN: NEGATIVE
PCP UR QL SCN>25 NG/ML: NEGATIVE
PH UR STRIP: 6 [PH] (ref 5–8)
PLATELET # BLD AUTO: 226 K/UL (ref 150–450)
PMV BLD AUTO: 9.1 FL (ref 9.2–12.9)
POTASSIUM SERPL-SCNC: 3.7 MMOL/L (ref 3.5–5.1)
PROT SERPL-MCNC: 7.7 G/DL (ref 6–8.4)
PROT UR QL STRIP: NEGATIVE
RBC # BLD AUTO: 4.55 M/UL (ref 4–5.4)
SARS-COV-2 RNA RESP QL NAA+PROBE: NOT DETECTED
SODIUM SERPL-SCNC: 141 MMOL/L (ref 136–145)
SP GR UR STRIP: 1.02 (ref 1–1.03)
TOXICOLOGY INFORMATION: ABNORMAL
URN SPEC COLLECT METH UR: NORMAL
UROBILINOGEN UR STRIP-ACNC: NEGATIVE EU/DL
WBC # BLD AUTO: 12.55 K/UL (ref 3.9–12.7)

## 2022-02-05 PROCEDURE — 80143 DRUG ASSAY ACETAMINOPHEN: CPT | Performed by: STUDENT IN AN ORGANIZED HEALTH CARE EDUCATION/TRAINING PROGRAM

## 2022-02-05 PROCEDURE — 99285 EMERGENCY DEPT VISIT HI MDM: CPT | Mod: 25

## 2022-02-05 PROCEDURE — 80053 COMPREHEN METABOLIC PANEL: CPT | Performed by: STUDENT IN AN ORGANIZED HEALTH CARE EDUCATION/TRAINING PROGRAM

## 2022-02-05 PROCEDURE — 82077 ASSAY SPEC XCP UR&BREATH IA: CPT | Performed by: STUDENT IN AN ORGANIZED HEALTH CARE EDUCATION/TRAINING PROGRAM

## 2022-02-05 PROCEDURE — 25000003 PHARM REV CODE 250: Performed by: STUDENT IN AN ORGANIZED HEALTH CARE EDUCATION/TRAINING PROGRAM

## 2022-02-05 PROCEDURE — 81003 URINALYSIS AUTO W/O SCOPE: CPT | Mod: 59 | Performed by: STUDENT IN AN ORGANIZED HEALTH CARE EDUCATION/TRAINING PROGRAM

## 2022-02-05 PROCEDURE — 85025 COMPLETE CBC W/AUTO DIFF WBC: CPT | Performed by: STUDENT IN AN ORGANIZED HEALTH CARE EDUCATION/TRAINING PROGRAM

## 2022-02-05 PROCEDURE — 81025 URINE PREGNANCY TEST: CPT | Performed by: STUDENT IN AN ORGANIZED HEALTH CARE EDUCATION/TRAINING PROGRAM

## 2022-02-05 PROCEDURE — 36415 COLL VENOUS BLD VENIPUNCTURE: CPT | Performed by: STUDENT IN AN ORGANIZED HEALTH CARE EDUCATION/TRAINING PROGRAM

## 2022-02-05 PROCEDURE — 80307 DRUG TEST PRSMV CHEM ANLYZR: CPT | Performed by: STUDENT IN AN ORGANIZED HEALTH CARE EDUCATION/TRAINING PROGRAM

## 2022-02-05 PROCEDURE — U0002 COVID-19 LAB TEST NON-CDC: HCPCS | Performed by: STUDENT IN AN ORGANIZED HEALTH CARE EDUCATION/TRAINING PROGRAM

## 2022-02-05 RX ORDER — ONDANSETRON 4 MG/1
4 TABLET, ORALLY DISINTEGRATING ORAL
Status: COMPLETED | OUTPATIENT
Start: 2022-02-05 | End: 2022-02-05

## 2022-02-05 RX ADMIN — ONDANSETRON 4 MG: 4 TABLET, ORALLY DISINTEGRATING ORAL at 02:02

## 2022-02-05 NOTE — ED PROVIDER NOTES
Encounter Date: 2022       History     Chief Complaint   Patient presents with    Psychiatric Evaluation     Pt to ER for SI     31-year-old female history of PTSD and depression not on medication due to insurance presents with suicidal ideation without plan.  Patient says that she has been feeling this way for over a month.  Patient has been bingeing methamphetamine and alcohol.  Patient says that she says by herself.  Denies any hallucination,        Review of patient's allergies indicates:   Allergen Reactions    Ambien [zolpidem]     Pcn [penicillins] Hives     Past Medical History:   Diagnosis Date    Depressed     Depression     Endometriosis     PTSD (post-traumatic stress disorder)     Seizures      Past Surgical History:   Procedure Laterality Date     SECTION      x2    HYSTERECTOMY      partial     TUBAL LIGATION       Family History   Problem Relation Age of Onset    Diabetes Mother     Hypertension Mother     Diabetes Father     Depression Father      Social History     Tobacco Use    Smoking status: Current Every Day Smoker     Packs/day: 2.00     Types: Cigarettes    Smokeless tobacco: Never Used   Substance Use Topics    Alcohol use: Yes     Comment: Last PM    Drug use: Yes     Types: Methamphetamines     Comment: Last PM     Review of Systems   Constitutional: Negative.    HENT: Negative.    Respiratory: Negative.    Cardiovascular: Negative.    Gastrointestinal: Negative.    Genitourinary: Negative.    Musculoskeletal: Negative.    Skin: Negative.    Neurological: Negative.    Psychiatric/Behavioral: Positive for suicidal ideas.   All other systems reviewed and are negative.      Physical Exam     Initial Vitals [22 0849]   BP Pulse Resp Temp SpO2   134/86 (!) 111 18 98.3 °F (36.8 °C) 99 %      MAP       --         Physical Exam    Nursing note and vitals reviewed.  Constitutional: Vital signs are normal. She appears well-developed and well-nourished.   HENT:    Head: Normocephalic and atraumatic.   Eyes: Conjunctivae and lids are normal.   Neck: Trachea normal. Neck supple.   Cardiovascular: Normal rate, regular rhythm and normal pulses.   Pulmonary/Chest: Breath sounds normal. No respiratory distress.   Abdominal: Abdomen is soft. Bowel sounds are normal.   Musculoskeletal:         General: Normal range of motion.      Cervical back: Neck supple.     Neurological: She is alert and oriented to person, place, and time.   Skin: Skin is warm. Capillary refill takes less than 2 seconds.   Psychiatric: Her speech is normal.   Tearful         ED Course   Procedures  Labs Reviewed   CBC W/ AUTO DIFFERENTIAL - Abnormal; Notable for the following components:       Result Value    MCH 31.9 (*)     MPV 9.1 (*)     Gran # (ANC) 8.3 (*)     Immature Grans (Abs) 0.05 (*)     All other components within normal limits   COMPREHENSIVE METABOLIC PANEL - Abnormal; Notable for the following components:    Calcium 8.6 (*)     Alkaline Phosphatase 162 (*)     Anion Gap 5 (*)     All other components within normal limits   DRUG SCREEN PANEL, URINE EMERGENCY - Abnormal; Notable for the following components:    Amphetamine Screen, Ur Presumptive Positive (*)     All other components within normal limits    Narrative:     Preferred Collection Type->Urine, Clean Catch  Specimen Source->Urine   ACETAMINOPHEN LEVEL - Abnormal; Notable for the following components:    Acetaminophen (Tylenol), Serum <2.0 (*)     All other components within normal limits   URINALYSIS, REFLEX TO URINE CULTURE    Narrative:     Preferred Collection Type->Urine, Clean Catch  Specimen Source->Urine   ALCOHOL,MEDICAL (ETHANOL)   PREGNANCY TEST, URINE RAPID    Narrative:     Specimen Source->Urine   SARS-COV-2 (COVID-19) QUALITATIVE PCR    Narrative:     Is the patient symptomatic?->No          Imaging Results    None          Medications - No data to display  Medical Decision Making:   Initial Assessment:   31-year-old  female history of PTSD and depression not on medication due to insurance presents with suicidal ideation without plan.  Mildly tachycardic but stable vitals.  Will medically clear patient for  Clinical Tests:   Lab Tests: Ordered and Reviewed  The following lab test(s) were unremarkable: CBC, CMP, Urinalysis and UPT  Radiological Study: Ordered and Reviewed             ED Course as of 02/05/22 1002   Sat Feb 05, 2022   0954 Patient medically clear for psych [HD]      ED Course User Index  [HD] Daniel Haney MD             Clinical Impression:   Final diagnoses:  [R45.851] Suicidal ideation (Primary)          ED Disposition Condition    Transfer to Psych Facility         ED Prescriptions     None        Follow-up Information     Follow up With Specialties Details Why Contact Info    Kortney Larson NP Family Medicine   [address]  [address]  [facility] [address]  [phone]             Daniel Haney MD  02/05/22 0956       Daniel Haney MD  02/05/22 1002

## 2022-02-06 NOTE — ED NOTES
Pt's mother called and notified pt accepted @ [facility] in [place] (ok per patient).  Pt remains calm and cooperative.

## 2022-04-12 ENCOUNTER — HOSPITAL ENCOUNTER (EMERGENCY)
Facility: HOSPITAL | Age: 32
Discharge: HOME | End: 2022-04-12
Attending: STUDENT IN AN ORGANIZED HEALTH CARE EDUCATION/TRAINING PROGRAM
Payer: MEDICAID

## 2022-04-12 VITALS
TEMPERATURE: 98 F | BODY MASS INDEX: 30.54 KG/M2 | WEIGHT: 167 LBS | DIASTOLIC BLOOD PRESSURE: 84 MMHG | OXYGEN SATURATION: 99 % | SYSTOLIC BLOOD PRESSURE: 109 MMHG | RESPIRATION RATE: 18 BRPM | HEART RATE: 100 BPM

## 2022-04-12 DIAGNOSIS — S39.012A LUMBAR STRAIN, INITIAL ENCOUNTER: Primary | ICD-10-CM

## 2022-04-12 DIAGNOSIS — R06.02 SOB (SHORTNESS OF BREATH): ICD-10-CM

## 2022-04-12 PROCEDURE — 25000003 PHARM REV CODE 250: Performed by: CLINICAL NURSE SPECIALIST

## 2022-04-12 PROCEDURE — 63600175 PHARM REV CODE 636 W HCPCS: Performed by: CLINICAL NURSE SPECIALIST

## 2022-04-12 PROCEDURE — 99284 EMERGENCY DEPT VISIT MOD MDM: CPT | Mod: 25

## 2022-04-12 PROCEDURE — 96372 THER/PROPH/DIAG INJ SC/IM: CPT | Performed by: CLINICAL NURSE SPECIALIST

## 2022-04-12 RX ORDER — METHOCARBAMOL 500 MG/1
500 TABLET, FILM COATED ORAL
Status: COMPLETED | OUTPATIENT
Start: 2022-04-12 | End: 2022-04-12

## 2022-04-12 RX ORDER — KETOROLAC TROMETHAMINE 30 MG/ML
30 INJECTION, SOLUTION INTRAMUSCULAR; INTRAVENOUS
Status: COMPLETED | OUTPATIENT
Start: 2022-04-12 | End: 2022-04-12

## 2022-04-12 RX ORDER — KETOROLAC TROMETHAMINE 10 MG/1
10 TABLET, FILM COATED ORAL EVERY 6 HOURS
Qty: 20 TABLET | Refills: 0 | Status: SHIPPED | OUTPATIENT
Start: 2022-04-12 | End: 2022-04-17

## 2022-04-12 RX ORDER — METHOCARBAMOL 500 MG/1
TABLET, FILM COATED ORAL
Status: DISPENSED
Start: 2022-04-12 | End: 2022-04-12

## 2022-04-12 RX ORDER — METHOCARBAMOL 500 MG/1
500 TABLET, FILM COATED ORAL 4 TIMES DAILY
Qty: 40 TABLET | Refills: 0 | Status: SHIPPED | OUTPATIENT
Start: 2022-04-12 | End: 2022-04-22

## 2022-04-12 RX ORDER — KETOROLAC TROMETHAMINE 30 MG/ML
INJECTION, SOLUTION INTRAMUSCULAR; INTRAVENOUS
Status: DISPENSED
Start: 2022-04-12 | End: 2022-04-12

## 2022-04-12 RX ADMIN — METHOCARBAMOL TABLETS 500 MG: 500 TABLET, COATED ORAL at 11:04

## 2022-04-12 RX ADMIN — KETOROLAC TROMETHAMINE 30 MG: 30 INJECTION, SOLUTION INTRAMUSCULAR at 11:04
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
## 2022-08-04 ENCOUNTER — HOSPITAL ENCOUNTER (EMERGENCY)
Facility: HOSPITAL | Age: 32
Discharge: HOME | End: 2022-08-04
Attending: STUDENT IN AN ORGANIZED HEALTH CARE EDUCATION/TRAINING PROGRAM
Payer: MEDICAID

## 2022-08-04 VITALS
OXYGEN SATURATION: 98 % | SYSTOLIC BLOOD PRESSURE: 110 MMHG | TEMPERATURE: 98 F | BODY MASS INDEX: 32.2 KG/M2 | RESPIRATION RATE: 18 BRPM | HEART RATE: 90 BPM | HEIGHT: 62 IN | DIASTOLIC BLOOD PRESSURE: 82 MMHG | WEIGHT: 175 LBS

## 2022-08-04 DIAGNOSIS — N64.4 BREAST PAIN: Primary | ICD-10-CM

## 2022-08-04 PROCEDURE — 99282 EMERGENCY DEPT VISIT SF MDM: CPT

## 2022-08-04 RX ORDER — CLINDAMYCIN HYDROCHLORIDE 300 MG/1
300 CAPSULE ORAL 3 TIMES DAILY
COMMUNITY
Start: 2022-07-27

## 2022-08-04 NOTE — ED PROVIDER NOTES
"Encounter Date: 2022       This note is dictated on M*Modal word recognition program.  There are word recognition mistakes and grammatical errors that are occasionally missed on review.    [facility]                                                  Chief Complaint  31 y.o. female with Breast Problem (Pt on Clindamycin for the past week due to "infection" to right breast - pt referred to ED today by PCP for worsening infection with drainage per patient. )    History of Present Illness  Norma Kang presents to the emergency room with complaints of right breast pain with tenderness and drainage around the areola.  Patient reports she has been on clindamycin for the last 7 days of prescribed by her PCP.  Patient reports she follow-uped with her PCP today and was sent to ER for further evaluation.  Patient reports pain around her nipple areolar region.  Patient reports at times when you squeeze her breast certain way pus comes out of her nipple area.    Past Medical History:   Diagnosis Date    Depressed     Depression     Endometriosis     PTSD (post-traumatic stress disorder)     Seizures      Past Surgical History:   Procedure Laterality Date     SECTION      x2    HYSTERECTOMY      partial     TUBAL LIGATION        Review of patient's allergies indicates:   Allergen Reactions    Ambien [zolpidem]     Pcn [penicillins] Hives        Review of Systems and Physical Exam     Review of Systems  -- Constitution - no fever, no weight loss, no loss of consciousness  -- Eyes - no changes in vision, no redness, no swelling  -- Ear, Nose - no  earache, denies congestion  -- Mouth,Throat - no sore throat, no toothache, normal voice, normal swallowing  -- Respiratory - denies cough and congestion, no shortness of breath, no wheezing  -- Cardiovascular - denies chest pain, no palpitations,   -- Gastrointestinal - denies abdominal pain, denies nausea, vomiting, and diarrhea  -- " "Genitourinary - no dysuria, no denies flank pain, no hematuria or frequency   -- Musculoskeletal - denies back pain, negative for myalgias and arthralgias   -- Neurological - no headache, no neurologic changes, no loss of bladder or bowel function no seizure like activity, no changes in hearing or vision  -- Skin -patient reports pain around her left nipple.  She reports a purulent drainage from nipples at times over the last week    Vital Signs   height is 5' 2" (1.575 m) and weight is 79.4 kg (175 lb). Her temperature is 98.4 °F (36.9 °C). Her blood pressure is 110/82 and her pulse is 90. Her respiration is 18 and oxygen saturation is 98%.      Physical Exam  -- Nursing note and vitals reviewed  -- Constitutional:  Awake alert and oriented, GCS 15, no acute distress.  Appears well.  -- Head: Atraumatic. Normocephalic. No obvious abnormality  -- Eyes: Pupils are equal and reactive to light. Extraocular movements intact. No nystagmus.  No periorbital swelling. Normal conjunctiva.  -- Nose: Nose grossly normal in appearance, nares grossly normal. No rhinorrhea.  -- Throat: Mucous membranes moist, pharynx normal, normal tonsils.  Airway patent.  -- Ears: External ears and TM normal bilaterally. Normal hearing.   -- Neck: Normal range of motion. Neck supple. No meningismus. No adenopathy  -- Cardiac: Normal rate, regular rhythm and normal heart sounds. No carotid bruit. No lower extremity edema.  -- Pulmonary: Normal respiratory effort, breath sounds equal bilaterally. Adequate flow.  No wheezing.  No crackles.  -- Abdominal: Soft, no tenderness, no guarding, no rebound. Normal bowel sounds.   -- Musculoskeletal: Normal range of motion, all 4 extremities 5/5 strength.  Neurovascularly intact. Atraumatic. No deformities.  -- Neurological:  Cranial nerves 2-12 grossly intact. No focal deficits.   -- Vascular: Posterior tibial, dorsalis pedis and radial pulses 2+ bilaterally    -- Lymphatics: No cervical or peripheral " lymphadenopathy.   -- Skin: Warm and dry. No evidence of rash or cellulitis patient has 2 small round sores next to her nipple.  No signs of cellulitis, erythema or induration with fluctuance.  Right breast is painful to palpation around the areola and nipple region.  -- Psychiatric: Normal mood and affect. Bedside behavior is appropriate.  Patient is cooperative.  Denies suicidal homicidal ideation.    Emergency Room Course     Treatment Course, Evaluation, and Medical Decision Making    Abnormal lab values  Labs Reviewed - No data to display    Medications Given  Medications - No data to display      Diagnosis  -- The encounter diagnosis was Breast pain.     Patient instructed to follow-up with OBGYN and/or Plastic surgery for further re-evaluation and treatment of breast pain with abnormal drainage.    Disposition and Plan  -- Disposition: home  -- Condition: stable  -- Follow-up: Patient to follow up with [facility] in 1-2 days, and any specialists noted on discharge paperwork  -- I advised the patient that we have found no life threatening condition today  -- At this time, I believe the patient is clinically stable for discharge.   -- The patient acknowledges that close follow up with a MD is required   -- Patient agrees to comply with all instruction and direction given in the ER  -- Patient counseled on strict return precautions as discussed      ED Course   Procedures  Labs Reviewed - No data to display       Imaging Results    None          Medications - No data to display                       Clinical Impression:   Final diagnoses:  [N64.4] Breast pain (Primary)          ED Disposition Condition    Discharge Stable        ED Prescriptions     None        Follow-up Information     Follow up With Specialties Details Why Contact Info    Nilsa Colvin III, MD Obstetrics, Obstetrics and Gynecology Schedule an appointment as soon as possible for a visit in 3 days  [address]  [facility] [address]  [phone]      Chen Cole MD Plastic Surgery Schedule an appointment as soon as possible for a visit   [address]   [address]  [facility] [address]  [phone]      Jcarlos Manzo MD General Surgery, Plastic Surgery Schedule an appointment as soon as possible for a visit in 3 days  [address]  [address]  [facility] [address]  [phone]             Garrett Martinez, CATHIE  08/04/22 1530

## 2023-03-20 ENCOUNTER — HOSPITAL ENCOUNTER (EMERGENCY)
Facility: HOSPITAL | Age: 33
Discharge: HOME | End: 2023-03-20
Attending: EMERGENCY MEDICINE
Payer: MEDICAID

## 2023-03-20 VITALS
TEMPERATURE: 99 F | RESPIRATION RATE: 20 BRPM | WEIGHT: 173.06 LBS | BODY MASS INDEX: 31.65 KG/M2 | SYSTOLIC BLOOD PRESSURE: 134 MMHG | DIASTOLIC BLOOD PRESSURE: 81 MMHG | OXYGEN SATURATION: 100 % | HEART RATE: 87 BPM

## 2023-03-20 DIAGNOSIS — S60.042A CONTUSION OF LEFT RING FINGER WITHOUT DAMAGE TO NAIL, INITIAL ENCOUNTER: Primary | ICD-10-CM

## 2023-03-20 PROCEDURE — 99283 EMERGENCY DEPT VISIT LOW MDM: CPT | Mod: ER

## 2023-03-20 RX ORDER — IBUPROFEN 400 MG/1
400 TABLET ORAL EVERY 6 HOURS PRN
Qty: 20 TABLET | Refills: 0 | Status: SHIPPED | OUTPATIENT
Start: 2023-03-20

## 2023-03-21 NOTE — ED PROVIDER NOTES
Encounter Date: 3/20/2023       History     Chief Complaint   Patient presents with    Hand Pain     Pt reports that she smashed her left hand while trying to change a tire on a 4 batres. Middle finger and ring finger pain     HPI  Pt reports smashing her left hand while changing a tire on a 4 batres. Pt reports pain to distal 4th finger of left hand. Pt denies fever, vomiting. Pain is worse with movement and improves c rest. Pt reports pain is a 4/10.    Review of patient's allergies indicates:   Allergen Reactions    Ambien [zolpidem]     Pcn [penicillins] Hives     Past Medical History:   Diagnosis Date    Depressed     Depression     Endometriosis     PTSD (post-traumatic stress disorder)     Seizures      Past Surgical History:   Procedure Laterality Date     SECTION      x2    HYSTERECTOMY      partial     TUBAL LIGATION       Family History   Problem Relation Age of Onset    Diabetes Mother     Hypertension Mother     Diabetes Father     Depression Father      Social History     Tobacco Use    Smoking status: Every Day     Packs/day: 2.00     Types: Cigarettes    Smokeless tobacco: Never   Substance Use Topics    Alcohol use: Yes     Comment: Last PM    Drug use: Yes     Types: Methamphetamines     Comment: Last PM     Review of Systems   Constitutional:  Negative for chills and fever.   HENT:  Negative for congestion.    Respiratory:  Negative for cough.    Cardiovascular:  Negative for chest pain.   Gastrointestinal:  Negative for abdominal pain.   Genitourinary:  Negative for flank pain.   Musculoskeletal:  Negative for back pain.        Left hand 4th digit injury   Hematological:  Does not bruise/bleed easily.     Physical Exam     Initial Vitals [23]   BP Pulse Resp Temp SpO2   134/81 87 20 98.8 °F (37.1 °C) 100 %      MAP       --         Physical Exam    Nursing note and vitals reviewed.  Constitutional: She appears well-developed and well-nourished. No distress.   HENT:   Head:  Normocephalic and atraumatic.   Mouth/Throat: Oropharynx is clear and moist.   Eyes: Conjunctivae and EOM are normal. Pupils are equal, round, and reactive to light.   Neck: Neck supple.   Normal range of motion.  Cardiovascular:  Normal rate, regular rhythm and normal heart sounds.           Pulmonary/Chest: Breath sounds normal. No respiratory distress.   Abdominal: Abdomen is soft. Bowel sounds are normal. She exhibits no distension. There is no abdominal tenderness.   Musculoskeletal:         General: Normal range of motion.      Cervical back: Normal range of motion and neck supple.      Comments: Left Hand 4th digit ecchymoses, tender to palpation, FROM, No deformity, No laceration     Neurological: She is alert and oriented to person, place, and time. She has normal strength.   Skin: Skin is warm and dry.   Psychiatric: She has a normal mood and affect. Thought content normal.       ED Course   Procedures  Labs Reviewed - No data to display       Imaging Results              X-Ray Hand 3 view Left (Final result)  Result time 03/20/23 20:36:24      Final result by Catrachito Boles MD (03/20/23 20:36:24)                   Impression:      No acute abnormality identified.  Correlation and further evaluation as warranted.      Electronically signed by: Catrachito Boles  Date:    03/20/2023  Time:    20:36               Narrative:    EXAMINATION:  XR HAND COMPLETE 3 VIEW LEFT    CLINICAL HISTORY:  pain;.    TECHNIQUE:  PA, lateral, and oblique views of the left hand were performed.    COMPARISON:  None    FINDINGS:  No fracture.  No traumatic malalignment.  No osseous destructive process.                                  9:13 PM - Counseling: Spoke with the patient and discussed todays findings, in addition to providing specific details for the plan of care and counseling regarding the diagnosis and prognosis. Questions are answered at this time.       Medications - No data to display  Medical Decision Making:    Initial Assessment:   33 yo c 4th digit injury  Differential Diagnosis:   Fracture, Dislocation, Contusion  Clinical Tests:   Radiological Study: Ordered and Reviewed  ED Management:  Patient presents with Left Hand 4th digit pain. Given history, exam and workup patient likely has Contusion. I have low suspicion for fracture, dislocation, significant ligamentous injury, septic arthritis.                        Clinical Impression:   Final diagnoses:  [S60.042A] Contusion of left ring finger without damage to nail, initial encounter (Primary)        ED Disposition Condition    Discharge Stable          ED Prescriptions       Medication Sig Dispense Start Date End Date Auth. Provider    ibuprofen (ADVIL,MOTRIN) 400 MG tablet Take 1 tablet (400 mg total) by mouth every 6 (six) hours as needed. 20 tablet 3/20/2023 -- Govind Garcia MD          Follow-up Information       Follow up With Specialties Details Why Contact Info    PCP  Call in 3 days      [facility] - Emergency Dept Emergency Medicine  If symptoms worsen [address]  [facility] [address]  [phone]             Govind Garcia MD  03/20/23 5265

## 2023-06-04 ENCOUNTER — HOSPITAL ENCOUNTER (EMERGENCY)
Facility: HOSPITAL | Age: 33
Discharge: HOME | End: 2023-06-04
Attending: EMERGENCY MEDICINE
Payer: MEDICAID

## 2023-06-04 VITALS
BODY MASS INDEX: 31.69 KG/M2 | DIASTOLIC BLOOD PRESSURE: 77 MMHG | HEART RATE: 92 BPM | SYSTOLIC BLOOD PRESSURE: 142 MMHG | WEIGHT: 173.31 LBS | OXYGEN SATURATION: 96 % | RESPIRATION RATE: 16 BRPM | TEMPERATURE: 98 F

## 2023-06-04 DIAGNOSIS — R10.32 LEFT LOWER QUADRANT ABDOMINAL PAIN: ICD-10-CM

## 2023-06-04 DIAGNOSIS — R10.9 FLANK PAIN: Primary | ICD-10-CM

## 2023-06-04 LAB
ALBUMIN SERPL BCP-MCNC: 3.9 G/DL (ref 3.5–5.2)
ALP SERPL-CCNC: 111 U/L (ref 55–135)
ALT SERPL W/O P-5'-P-CCNC: 20 U/L (ref 10–44)
ANION GAP SERPL CALC-SCNC: 10 MMOL/L (ref 8–16)
AST SERPL-CCNC: 20 U/L (ref 10–40)
BASOPHILS # BLD AUTO: 0.06 K/UL (ref 0–0.2)
BASOPHILS NFR BLD: 0.4 % (ref 0–1.9)
BILIRUB SERPL-MCNC: 0.8 MG/DL (ref 0.1–1)
BILIRUB UR QL STRIP: NEGATIVE
BUN SERPL-MCNC: 12 MG/DL (ref 6–20)
CALCIUM SERPL-MCNC: 8.9 MG/DL (ref 8.7–10.5)
CHLORIDE SERPL-SCNC: 111 MMOL/L (ref 95–110)
CLARITY UR REFRACT.AUTO: CLEAR
CO2 SERPL-SCNC: 22 MMOL/L (ref 23–29)
COLOR UR AUTO: YELLOW
CREAT SERPL-MCNC: 1 MG/DL (ref 0.5–1.4)
DIFFERENTIAL METHOD: ABNORMAL
EOSINOPHIL # BLD AUTO: 0.1 K/UL (ref 0–0.5)
EOSINOPHIL NFR BLD: 0.6 % (ref 0–8)
ERYTHROCYTE [DISTWIDTH] IN BLOOD BY AUTOMATED COUNT: 12.5 % (ref 11.5–14.5)
EST. GFR  (NO RACE VARIABLE): >60 ML/MIN/1.73 M^2
GLUCOSE SERPL-MCNC: 91 MG/DL (ref 70–110)
GLUCOSE UR QL STRIP: NEGATIVE
HCT VFR BLD AUTO: 37.3 % (ref 37–48.5)
HGB BLD-MCNC: 12.8 G/DL (ref 12–16)
HGB UR QL STRIP: NEGATIVE
IMM GRANULOCYTES # BLD AUTO: 0.05 K/UL (ref 0–0.04)
IMM GRANULOCYTES NFR BLD AUTO: 0.4 % (ref 0–0.5)
KETONES UR QL STRIP: NEGATIVE
LEUKOCYTE ESTERASE UR QL STRIP: NEGATIVE
LYMPHOCYTES # BLD AUTO: 3.9 K/UL (ref 1–4.8)
LYMPHOCYTES NFR BLD: 27.5 % (ref 18–48)
MCH RBC QN AUTO: 31.6 PG (ref 27–31)
MCHC RBC AUTO-ENTMCNC: 34.3 G/DL (ref 32–36)
MCV RBC AUTO: 92 FL (ref 82–98)
MONOCYTES # BLD AUTO: 1 K/UL (ref 0.3–1)
MONOCYTES NFR BLD: 7.2 % (ref 4–15)
NEUTROPHILS # BLD AUTO: 9 K/UL (ref 1.8–7.7)
NEUTROPHILS NFR BLD: 63.9 % (ref 38–73)
NITRITE UR QL STRIP: NEGATIVE
NRBC BLD-RTO: 0 /100 WBC
PH UR STRIP: 8 [PH] (ref 5–8)
PLATELET # BLD AUTO: 239 K/UL (ref 150–450)
PMV BLD AUTO: 9.5 FL (ref 9.2–12.9)
POTASSIUM SERPL-SCNC: 4.1 MMOL/L (ref 3.5–5.1)
PROT SERPL-MCNC: 6.6 G/DL (ref 6–8.4)
PROT UR QL STRIP: NEGATIVE
RBC # BLD AUTO: 4.05 M/UL (ref 4–5.4)
SODIUM SERPL-SCNC: 143 MMOL/L (ref 136–145)
SP GR UR STRIP: 1.01 (ref 1–1.03)
URN SPEC COLLECT METH UR: NORMAL
UROBILINOGEN UR STRIP-ACNC: <2 EU/DL
WBC # BLD AUTO: 14.14 K/UL (ref 3.9–12.7)

## 2023-06-04 PROCEDURE — 80053 COMPREHEN METABOLIC PANEL: CPT | Mod: ER | Performed by: REGISTERED NURSE

## 2023-06-04 PROCEDURE — 85025 COMPLETE CBC W/AUTO DIFF WBC: CPT | Mod: ER | Performed by: REGISTERED NURSE

## 2023-06-04 PROCEDURE — 99285 EMERGENCY DEPT VISIT HI MDM: CPT | Mod: 25,ER

## 2023-06-04 PROCEDURE — 25000003 PHARM REV CODE 250: Mod: ER | Performed by: REGISTERED NURSE

## 2023-06-04 PROCEDURE — 96375 TX/PRO/DX INJ NEW DRUG ADDON: CPT | Mod: ER

## 2023-06-04 PROCEDURE — 63600175 PHARM REV CODE 636 W HCPCS: Mod: ER | Performed by: REGISTERED NURSE

## 2023-06-04 PROCEDURE — 96361 HYDRATE IV INFUSION ADD-ON: CPT | Mod: ER

## 2023-06-04 PROCEDURE — 81003 URINALYSIS AUTO W/O SCOPE: CPT | Mod: ER | Performed by: REGISTERED NURSE

## 2023-06-04 PROCEDURE — 87389 HIV-1 AG W/HIV-1&-2 AB AG IA: CPT | Performed by: REGISTERED NURSE

## 2023-06-04 PROCEDURE — 96374 THER/PROPH/DIAG INJ IV PUSH: CPT | Mod: ER

## 2023-06-04 RX ORDER — CYCLOBENZAPRINE HCL 10 MG
10 TABLET ORAL 3 TIMES DAILY PRN
Qty: 15 TABLET | Refills: 0 | Status: SHIPPED | OUTPATIENT
Start: 2023-06-04 | End: 2023-06-09

## 2023-06-04 RX ORDER — ONDANSETRON 4 MG/1
4 TABLET, ORALLY DISINTEGRATING ORAL EVERY 6 HOURS PRN
Qty: 12 TABLET | Refills: 0 | OUTPATIENT
Start: 2023-06-04 | End: 2024-02-18

## 2023-06-04 RX ORDER — ONDANSETRON 2 MG/ML
4 INJECTION INTRAMUSCULAR; INTRAVENOUS
Status: COMPLETED | OUTPATIENT
Start: 2023-06-04 | End: 2023-06-04

## 2023-06-04 RX ORDER — KETOROLAC TROMETHAMINE 30 MG/ML
30 INJECTION, SOLUTION INTRAMUSCULAR; INTRAVENOUS
Status: COMPLETED | OUTPATIENT
Start: 2023-06-04 | End: 2023-06-04

## 2023-06-04 RX ORDER — DICLOFENAC SODIUM 50 MG/1
50 TABLET, DELAYED RELEASE ORAL 2 TIMES DAILY
Qty: 20 TABLET | Refills: 0 | Status: SHIPPED | OUTPATIENT
Start: 2023-06-04

## 2023-06-04 RX ADMIN — SODIUM CHLORIDE 1000 ML: 9 INJECTION, SOLUTION INTRAVENOUS at 05:06

## 2023-06-04 RX ADMIN — ONDANSETRON 4 MG: 2 INJECTION INTRAMUSCULAR; INTRAVENOUS at 04:06

## 2023-06-04 RX ADMIN — KETOROLAC TROMETHAMINE 30 MG: 30 INJECTION, SOLUTION INTRAMUSCULAR; INTRAVENOUS at 04:06

## 2023-06-04 NOTE — ED PROVIDER NOTES
Encounter Date: 2023       History     Chief Complaint   Patient presents with    Abdominal Pain     Bruise on LLQ x 1 week unsure what it was from. Last night began having shooting pains in lower abdomen. Vomiting. Denies diarrhea     32-year-old female presents emergency department with complaints of left flank pain that began last night.  Patient reports bruise to abdomen x1 week, unsure of any injury.  Associated symptoms include vomiting.  Patient denies any fever, chills, chest pain, shortness of breath, diarrhea or any other symptoms at this time.    The history is provided by the patient.   Review of patient's allergies indicates:   Allergen Reactions    Ambien [zolpidem]     Pcn [penicillins] Hives     Past Medical History:   Diagnosis Date    Depressed     Depression     Endometriosis     PTSD (post-traumatic stress disorder)     Seizures      Past Surgical History:   Procedure Laterality Date     SECTION      x2    HYSTERECTOMY      partial     TUBAL LIGATION       Family History   Problem Relation Age of Onset    Diabetes Mother     Hypertension Mother     Diabetes Father     Depression Father      Social History     Tobacco Use    Smoking status: Every Day     Packs/day: 2.00     Types: Cigarettes    Smokeless tobacco: Never   Substance Use Topics    Alcohol use: Yes     Comment: Last PM    Drug use: Yes     Types: Methamphetamines     Comment: Last PM     Review of Systems   Constitutional:  Negative for fever.   HENT:  Negative for sore throat.    Respiratory:  Negative for shortness of breath.    Cardiovascular:  Negative for chest pain.   Gastrointestinal:  Positive for abdominal pain, nausea and vomiting.   Genitourinary:  Positive for flank pain. Negative for dysuria.   Musculoskeletal:  Negative for back pain.   Skin:  Negative for rash.   Neurological:  Negative for weakness.   Hematological:  Does not bruise/bleed easily.   All other systems reviewed and are negative.    Physical  Exam     Initial Vitals [06/04/23 1618]   BP Pulse Resp Temp SpO2   (!) 142/77 92 16 98.1 °F (36.7 °C) 96 %      MAP       --         Physical Exam    Constitutional: She appears well-developed and well-nourished. She is not diaphoretic. No distress.   HENT:   Head: Normocephalic and atraumatic.   Eyes: Conjunctivae and EOM are normal. Pupils are equal, round, and reactive to light.   Neck: Neck supple.   Normal range of motion.  Cardiovascular:  Normal rate, regular rhythm and normal heart sounds.           No murmur heard.  Pulmonary/Chest: Breath sounds normal. No respiratory distress. She has no wheezes. She has no rales.   Abdominal: Abdomen is soft. Bowel sounds are normal. There is abdominal tenderness in the left upper quadrant and left lower quadrant.   Older bruise noted to left lower abdomen   There is left CVA tenderness.     There is no rebound and no guarding.   Musculoskeletal:         General: No tenderness or edema. Normal range of motion.      Cervical back: Normal range of motion and neck supple.     Neurological: She is alert and oriented to person, place, and time. No cranial nerve deficit. GCS score is 15. GCS eye subscore is 4. GCS verbal subscore is 5. GCS motor subscore is 6.   Skin: Skin is warm and dry. Capillary refill takes less than 2 seconds.   Psychiatric: She has a normal mood and affect. Thought content normal.       ED Course   Procedures  Labs Reviewed   CBC W/ AUTO DIFFERENTIAL - Abnormal; Notable for the following components:       Result Value    WBC 14.14 (*)     MCH 31.6 (*)     Gran # (ANC) 9.0 (*)     Immature Grans (Abs) 0.05 (*)     All other components within normal limits    Narrative:     Release to patient->Immediate   COMPREHENSIVE METABOLIC PANEL - Abnormal; Notable for the following components:    Chloride 111 (*)     CO2 22 (*)     All other components within normal limits    Narrative:     Release to patient->Immediate   URINALYSIS, REFLEX TO URINE CULTURE     Narrative:     Specimen Source->Urine   HIV 1 / 2 ANTIBODY   HEPATITIS C ANTIBODY   HEP C VIRUS HOLD SPECIMEN          Imaging Results              CT Renal Stone Study ABD Pelvis WO (Final result)  Result time 06/04/23 17:35:11      Final result by Mary Devlin MD (06/04/23 17:35:11)                   Impression:      No sizable calcified urolith or obstructive uropathy or other acute finding      Electronically signed by: Mary Devlin  Date:    06/04/2023  Time:    17:35               Narrative:    EXAMINATION:  CT RENAL STONE STUDY ABD PELVIS WO    CLINICAL HISTORY:  Flank pain, kidney stone suspected;    TECHNIQUE:  Low dose axial images, sagittal and coronal reformations were obtained from the lung bases to the pubic symphysis.  Contrast was not administered.  Iterative technique for diminishing radiation exposure automated    COMPARISON:  March 13, 2022 report and imaging 2021    FINDINGS:  Liver and spleen stable size    Pancreas unremarkable    Gallbladder present    Kidneys without hydronephrosis or sizable calcification    Urinary bladder decompressed    No obstructive or inflammatory bowel findings    Grade 1 spondylolisthesis lower lumbar                                       Medications   sodium chloride 0.9% bolus 1,000 mL 1,000 mL (1,000 mLs Intravenous New Bag 6/4/23 1700)   ketorolac injection 30 mg (30 mg Intravenous Given 6/4/23 1659)   ondansetron injection 4 mg (4 mg Intravenous Given 6/4/23 1659)     Medical Decision Making:   Clinical Tests:   Lab Tests: Ordered and Reviewed  The following lab test(s) were unremarkable: CBC, CMP and Urinalysis       <> Summary of Lab: Mildly elevated WBC  ED Management:  CT negative.  Will treat with muscle relaxers and anti-inflammatories.  Zofran as needed for nausea.  Advised patient to return the emergency department if symptoms worsen.                        Clinical Impression:   Final diagnoses:  [R10.9] Flank pain (Primary)  [R10.32] Left  lower quadrant abdominal pain        ED Disposition Condition    Discharge Stable          ED Prescriptions       Medication Sig Dispense Start Date End Date Auth. Provider    diclofenac (VOLTAREN) 50 MG EC tablet Take 1 tablet (50 mg total) by mouth 2 (two) times daily. 20 tablet 6/4/2023 -- MICHAEL Santiago Jr.    cyclobenzaprine (FLEXERIL) 10 MG tablet Take 1 tablet (10 mg total) by mouth 3 (three) times daily as needed for Muscle spasms. 15 tablet 6/4/2023 6/9/2023 MICHAEL Santiago Jr.    ondansetron (ZOFRAN-ODT) 4 MG TbDL Take 1 tablet (4 mg total) by mouth every 6 (six) hours as needed (vomiting). 12 tablet 6/4/2023 -- MICHAEL Santiago Jr.          Follow-up Information       Follow up With Specialties Details Why Contact Info    [facility] - Emergency Dept Emergency Medicine  If symptoms worsen [address]  [facility] [address] [phone]             MICHAEL Santiago Jr.  06/04/23 7742

## 2023-06-04 NOTE — Clinical Note
"Norma Wyatt" Jorge Luis was seen and treated in our emergency department on 6/4/2023.  She may return to work on 06/06/2023.       If you have any questions or concerns, please don't hesitate to call.      Antonio Dutton Jr., FNP" no

## 2023-06-05 LAB — HIV 1+2 AB+HIV1 P24 AG SERPL QL IA: NEGATIVE

## 2023-10-13 ENCOUNTER — HOSPITAL ENCOUNTER (EMERGENCY)
Facility: HOSPITAL | Age: 33
Discharge: HOME | End: 2023-10-13
Attending: EMERGENCY MEDICINE
Payer: MEDICAID

## 2023-10-13 VITALS
WEIGHT: 180.81 LBS | HEIGHT: 62 IN | SYSTOLIC BLOOD PRESSURE: 124 MMHG | DIASTOLIC BLOOD PRESSURE: 74 MMHG | RESPIRATION RATE: 18 BRPM | OXYGEN SATURATION: 99 % | HEART RATE: 84 BPM | TEMPERATURE: 98 F | BODY MASS INDEX: 33.27 KG/M2

## 2023-10-13 DIAGNOSIS — M54.12 CERVICAL RADICULOPATHY: ICD-10-CM

## 2023-10-13 PROCEDURE — 63600175 PHARM REV CODE 636 W HCPCS: Performed by: EMERGENCY MEDICINE

## 2023-10-13 PROCEDURE — 99284 EMERGENCY DEPT VISIT MOD MDM: CPT

## 2023-10-13 PROCEDURE — 96372 THER/PROPH/DIAG INJ SC/IM: CPT | Performed by: EMERGENCY MEDICINE

## 2023-10-13 RX ORDER — PREDNISONE 10 MG/1
10 TABLET ORAL DAILY
Qty: 21 TABLET | Refills: 0 | Status: SHIPPED | OUTPATIENT
Start: 2023-10-13

## 2023-10-13 RX ORDER — KETOROLAC TROMETHAMINE 30 MG/ML
30 INJECTION, SOLUTION INTRAMUSCULAR; INTRAVENOUS
Status: COMPLETED | OUTPATIENT
Start: 2023-10-13 | End: 2023-10-13

## 2023-10-13 RX ADMIN — KETOROLAC TROMETHAMINE 30 MG: 30 INJECTION, SOLUTION INTRAMUSCULAR; INTRAVENOUS at 09:10

## 2023-10-14 NOTE — ED PROVIDER NOTES
"Encounter Date: 10/13/2023       History     Chief Complaint   Patient presents with    Arm Pain     Patient reports right elbow pain x 2-3 wks. Began having tingling in hand this morning radiating to neck.      32 yo female here with atraumatic RUE pain and tingling. Ongoing for 3 weeks. No numbness. No weakness. Similar to previous. No fever. No known sick contacts.       Review of patient's allergies indicates:   Allergen Reactions    Ambien [zolpidem]      "They had trouble getting me awake from it."     Pcn [penicillins] Hives     Past Medical History:   Diagnosis Date    Depressed     Depression     Endometriosis     PTSD (post-traumatic stress disorder)     Seizures      Past Surgical History:   Procedure Laterality Date     SECTION      x2    HYSTERECTOMY      partial     TUBAL LIGATION       Family History   Problem Relation Age of Onset    Diabetes Mother     Hypertension Mother     Diabetes Father     Depression Father      Social History     Tobacco Use    Smoking status: Every Day     Current packs/day: 2.00     Types: Cigarettes    Smokeless tobacco: Never   Substance Use Topics    Alcohol use: Yes     Comment: Last PM    Drug use: Yes     Types: Methamphetamines     Comment: Last PM     Review of Systems   Constitutional: Negative.    Respiratory: Negative.     Cardiovascular: Negative.    Gastrointestinal: Negative.    All other systems reviewed and are negative.      Physical Exam     Initial Vitals [10/13/23 2058]   BP Pulse Resp Temp SpO2   124/74 84 18 98.3 °F (36.8 °C) 99 %      MAP       --         Physical Exam    Nursing note and vitals reviewed.  Constitutional: She appears well-developed and well-nourished. She is not diaphoretic. No distress.   HENT:   Head: Normocephalic and atraumatic.   Eyes: EOM are normal. Pupils are equal, round, and reactive to light.   Neck: Neck supple.   Normal range of motion.  Cardiovascular:  Normal rate, regular rhythm and normal heart sounds.     " Exam reveals no gallop and no friction rub.       No murmur heard.  Pulmonary/Chest: Breath sounds normal. No respiratory distress. She has no wheezes. She has no rales.   Abdominal: Abdomen is soft. Bowel sounds are normal. She exhibits no distension. There is no abdominal tenderness. There is no rebound.   Musculoskeletal:         General: No tenderness or edema. Normal range of motion.      Cervical back: Normal range of motion and neck supple.     Neurological: She is alert and oriented to person, place, and time. She has normal strength and normal reflexes. GCS score is 15. GCS eye subscore is 4. GCS verbal subscore is 5. GCS motor subscore is 6.   Skin: Skin is warm and dry. Capillary refill takes less than 2 seconds.         ED Course   Procedures  Labs Reviewed - No data to display       Imaging Results              X-Ray Cervical Spine AP And Lateral (Final result)  Result time 10/14/23 11:19:17      Final result by Stevan Bay MD (10/14/23 11:19:17)                   Impression:      No acute findings.      Electronically signed by: Stevan Bya MD  Date:    10/14/2023  Time:    11:19               Narrative:    EXAMINATION:  XR CERVICAL SPINE AP LATERAL    CLINICAL HISTORY:  Radiculopathy, cervical region    TECHNIQUE:  AP, lateral and open mouth views of the cervical spine were performed.    FINDINGS:  Normal lordosis.  No fracture or dislocation.  No prevertebral soft tissue swelling.                                    X-Rays:   Independently Interpreted Readings:   Other Readings:  XR c spine: no acute findings.     Medications   ketorolac injection 30 mg (30 mg Intramuscular Given 10/13/23 2129)     Medical Decision Making  Symptoms suggestive of radiculopathy. Stable for PCP follow up.     Problems Addressed:  Cervical radiculopathy: self-limited or minor problem    Amount and/or Complexity of Data Reviewed  Radiology: ordered and independent interpretation performed. Decision-making details  documented in ED Course.    Risk  Prescription drug management.                               Clinical Impression:   Final diagnoses:  [M54.12] Cervical radiculopathy        ED Disposition Condition    Discharge Stable          ED Prescriptions       Medication Sig Dispense Start Date End Date Auth. Provider    predniSONE (DELTASONE) 10 MG tablet Take 1 tablet (10 mg total) by mouth once daily. Take 4 tabs x 3 days, then take 2 tabs x 3 days, then take 1 tab x 3 days. 21 tablet 10/13/2023 -- Talib Dunham MD          Follow-up Information    None          Talib Dunham MD  10/16/23 0337

## 2024-02-18 ENCOUNTER — HOSPITAL ENCOUNTER (EMERGENCY)
Facility: HOSPITAL | Age: 34
Discharge: HOME | End: 2024-02-18
Attending: EMERGENCY MEDICINE
Payer: COMMERCIAL

## 2024-02-18 VITALS
HEART RATE: 78 BPM | BODY MASS INDEX: 33.09 KG/M2 | OXYGEN SATURATION: 98 % | SYSTOLIC BLOOD PRESSURE: 103 MMHG | TEMPERATURE: 98 F | HEIGHT: 62 IN | DIASTOLIC BLOOD PRESSURE: 57 MMHG | RESPIRATION RATE: 19 BRPM | WEIGHT: 179.81 LBS

## 2024-02-18 DIAGNOSIS — R07.89 CHEST WALL PAIN: ICD-10-CM

## 2024-02-18 DIAGNOSIS — R07.9 CHEST PAIN, UNSPECIFIED TYPE: Primary | ICD-10-CM

## 2024-02-18 DIAGNOSIS — R07.9 CHEST PAIN: ICD-10-CM

## 2024-02-18 LAB
ALBUMIN SERPL BCP-MCNC: 3.9 G/DL (ref 3.5–5.2)
ALP SERPL-CCNC: 124 U/L (ref 55–135)
ALT SERPL W/O P-5'-P-CCNC: 25 U/L (ref 10–44)
ANION GAP SERPL CALC-SCNC: 13 MMOL/L (ref 8–16)
AST SERPL-CCNC: 21 U/L (ref 10–40)
BASOPHILS # BLD AUTO: 0.07 K/UL (ref 0–0.2)
BASOPHILS NFR BLD: 0.6 % (ref 0–1.9)
BILIRUB SERPL-MCNC: 0.9 MG/DL (ref 0.1–1)
BNP SERPL-MCNC: <10 PG/ML (ref 0–99)
BUN SERPL-MCNC: 16 MG/DL (ref 6–20)
CALCIUM SERPL-MCNC: 9.4 MG/DL (ref 8.7–10.5)
CHLORIDE SERPL-SCNC: 105 MMOL/L (ref 95–110)
CO2 SERPL-SCNC: 22 MMOL/L (ref 23–29)
CREAT SERPL-MCNC: 1 MG/DL (ref 0.5–1.4)
DIFFERENTIAL METHOD BLD: NORMAL
EOSINOPHIL # BLD AUTO: 0.1 K/UL (ref 0–0.5)
EOSINOPHIL NFR BLD: 0.6 % (ref 0–8)
ERYTHROCYTE [DISTWIDTH] IN BLOOD BY AUTOMATED COUNT: 12.6 % (ref 11.5–14.5)
EST. GFR  (NO RACE VARIABLE): >60 ML/MIN/1.73 M^2
GLUCOSE SERPL-MCNC: 182 MG/DL (ref 70–110)
HCT VFR BLD AUTO: 41.6 % (ref 37–48.5)
HGB BLD-MCNC: 13.9 G/DL (ref 12–16)
IMM GRANULOCYTES # BLD AUTO: 0.03 K/UL (ref 0–0.04)
IMM GRANULOCYTES NFR BLD AUTO: 0.3 % (ref 0–0.5)
INFLUENZA A, MOLECULAR: NEGATIVE
INFLUENZA B, MOLECULAR: NEGATIVE
LYMPHOCYTES # BLD AUTO: 3.5 K/UL (ref 1–4.8)
LYMPHOCYTES NFR BLD: 30.2 % (ref 18–48)
MCH RBC QN AUTO: 30.3 PG (ref 27–31)
MCHC RBC AUTO-ENTMCNC: 33.4 G/DL (ref 32–36)
MCV RBC AUTO: 91 FL (ref 82–98)
MONOCYTES # BLD AUTO: 0.9 K/UL (ref 0.3–1)
MONOCYTES NFR BLD: 7.3 % (ref 4–15)
NEUTROPHILS # BLD AUTO: 7.2 K/UL (ref 1.8–7.7)
NEUTROPHILS NFR BLD: 61 % (ref 38–73)
NRBC BLD-RTO: 0 /100 WBC
PLATELET # BLD AUTO: 283 K/UL (ref 150–450)
PMV BLD AUTO: 10.3 FL (ref 9.2–12.9)
POTASSIUM SERPL-SCNC: 4 MMOL/L (ref 3.5–5.1)
PROT SERPL-MCNC: 7.4 G/DL (ref 6–8.4)
RBC # BLD AUTO: 4.58 M/UL (ref 4–5.4)
SARS-COV-2 RDRP RESP QL NAA+PROBE: NEGATIVE
SODIUM SERPL-SCNC: 140 MMOL/L (ref 136–145)
SPECIMEN SOURCE: NORMAL
TROPONIN I SERPL DL<=0.01 NG/ML-MCNC: <0.006 NG/ML (ref 0–0.03)
WBC # BLD AUTO: 11.73 K/UL (ref 3.9–12.7)

## 2024-02-18 PROCEDURE — 96374 THER/PROPH/DIAG INJ IV PUSH: CPT | Mod: ER

## 2024-02-18 PROCEDURE — 25000003 PHARM REV CODE 250: Mod: ER | Performed by: EMERGENCY MEDICINE

## 2024-02-18 PROCEDURE — 80053 COMPREHEN METABOLIC PANEL: CPT | Mod: ER | Performed by: EMERGENCY MEDICINE

## 2024-02-18 PROCEDURE — 85025 COMPLETE CBC W/AUTO DIFF WBC: CPT | Mod: ER | Performed by: EMERGENCY MEDICINE

## 2024-02-18 PROCEDURE — 84484 ASSAY OF TROPONIN QUANT: CPT | Mod: ER | Performed by: EMERGENCY MEDICINE

## 2024-02-18 PROCEDURE — U0002 COVID-19 LAB TEST NON-CDC: HCPCS | Mod: ER | Performed by: EMERGENCY MEDICINE

## 2024-02-18 PROCEDURE — 87502 INFLUENZA DNA AMP PROBE: CPT | Mod: ER | Performed by: EMERGENCY MEDICINE

## 2024-02-18 PROCEDURE — 93010 ELECTROCARDIOGRAM REPORT: CPT | Mod: 76,,, | Performed by: INTERNAL MEDICINE

## 2024-02-18 PROCEDURE — 83880 ASSAY OF NATRIURETIC PEPTIDE: CPT | Mod: ER | Performed by: EMERGENCY MEDICINE

## 2024-02-18 PROCEDURE — 96361 HYDRATE IV INFUSION ADD-ON: CPT | Mod: ER

## 2024-02-18 PROCEDURE — 93005 ELECTROCARDIOGRAM TRACING: CPT | Mod: ER

## 2024-02-18 PROCEDURE — 99285 EMERGENCY DEPT VISIT HI MDM: CPT | Mod: 25,ER

## 2024-02-18 PROCEDURE — 94761 N-INVAS EAR/PLS OXIMETRY MLT: CPT | Mod: ER

## 2024-02-18 PROCEDURE — 63600175 PHARM REV CODE 636 W HCPCS: Mod: ER | Performed by: EMERGENCY MEDICINE

## 2024-02-18 RX ORDER — ONDANSETRON 4 MG/1
4 TABLET, ORALLY DISINTEGRATING ORAL EVERY 6 HOURS PRN
Qty: 30 TABLET | Refills: 0 | Status: SHIPPED | OUTPATIENT
Start: 2024-02-18

## 2024-02-18 RX ORDER — ASPIRIN 325 MG
325 TABLET ORAL
Status: COMPLETED | OUTPATIENT
Start: 2024-02-18 | End: 2024-02-18

## 2024-02-18 RX ORDER — ONDANSETRON HYDROCHLORIDE 2 MG/ML
4 INJECTION, SOLUTION INTRAVENOUS
Status: COMPLETED | OUTPATIENT
Start: 2024-02-18 | End: 2024-02-18

## 2024-02-18 RX ORDER — NAPROXEN 500 MG/1
500 TABLET ORAL 2 TIMES DAILY WITH MEALS
Qty: 20 TABLET | Refills: 0 | Status: SHIPPED | OUTPATIENT
Start: 2024-02-18

## 2024-02-18 RX ADMIN — SODIUM CHLORIDE 1000 ML: 9 INJECTION, SOLUTION INTRAVENOUS at 07:02

## 2024-02-18 RX ADMIN — ONDANSETRON 4 MG: 2 INJECTION INTRAMUSCULAR; INTRAVENOUS at 07:02

## 2024-02-18 RX ADMIN — ASPIRIN 325 MG: 325 TABLET ORAL at 07:02

## 2024-02-19 LAB
OHS QRS DURATION: 90 MS
OHS QRS DURATION: 96 MS
OHS QTC CALCULATION: 429 MS
OHS QTC CALCULATION: 445 MS

## 2024-02-19 NOTE — ED PROVIDER NOTES
"Encounter Date: 2/18/2024       History     Chief Complaint   Patient presents with    Chest Pain     States she's been having episodes of chest pain interm. X1 month. Most recent episode started lastnight. Left sided, radiates down left arm. Described as sharp. Associated with nausea     The history is provided by the patient.   Chest Pain  The current episode started several days ago. Duration of episode(s) is 3 days. Chest pain occurs intermittently. The chest pain is unchanged. The pain is associated with coughing and lifting (palpation in left upper chest area). Pain scale at highest: mild. Pain scale currently: mild. The quality of the pain is described as aching and stabbing. The pain does not radiate. Chest pain is worsened by certain positions (palpation). Primary symptoms include cough (pt had cold last week, which has improved but now having chest wall pain) and nausea. Pertinent negatives for primary symptoms include no fever, no fatigue, no syncope, no shortness of breath, no wheezing, no palpitations, no abdominal pain, no vomiting, no dizziness and no altered mental status.   The cough is non-productive. There is nondescript sputum produced.   Nausea began today.   Pertinent negatives for associated symptoms include no claudication, no diaphoresis, no lower extremity edema, no near-syncope, no numbness, no orthopnea, no paroxysmal nocturnal dyspnea and no weakness. She tried nothing for the symptoms. There are no known risk factors. Past medical history comments: heart arrhythmia noted when pt was pregnant 13 years ago. pt states evaluation benign and pt has not needed any cardiac f/u per report   Procedure history is negative for cardiac catheterization, echocardiogram, persantine thallium, stress echo, stress thallium, exercise treadmill test and coronary CTA.     Review of patient's allergies indicates:   Allergen Reactions    Ambien [zolpidem]      "They had trouble getting me awake from it."     " Levetiracetam      (Keppra) pt states cannot take it caused her to have more seizures    Pcn [penicillins] Hives    Bupropion hcl Other (See Comments)     Patient states had hallicinations when taking this med     Past Medical History:   Diagnosis Date    Depressed     Depression     Endometriosis     PTSD (post-traumatic stress disorder)     Seizures      Past Surgical History:   Procedure Laterality Date     SECTION      x2    HYSTERECTOMY      partial     TUBAL LIGATION       Family History   Problem Relation Age of Onset    Diabetes Mother     Hypertension Mother     Diabetes Father     Depression Father      Social History     Tobacco Use    Smoking status: Every Day     Current packs/day: 2.00     Types: Cigarettes    Smokeless tobacco: Never   Substance Use Topics    Alcohol use: Yes     Comment: Last PM    Drug use: Yes     Types: Methamphetamines     Comment: Last PM     Review of Systems   Constitutional:  Negative for diaphoresis, fatigue and fever.   HENT:  Negative for sore throat.    Respiratory:  Positive for cough (pt had cold last week, which has improved but now having chest wall pain). Negative for shortness of breath and wheezing.    Cardiovascular:  Positive for chest pain. Negative for palpitations, orthopnea, claudication, syncope and near-syncope.   Gastrointestinal:  Positive for nausea. Negative for abdominal pain and vomiting.   Genitourinary:  Negative for dysuria.   Musculoskeletal:  Negative for back pain.   Skin:  Negative for rash.   Neurological:  Negative for dizziness, weakness and numbness.   Hematological:  Does not bruise/bleed easily.   All other systems reviewed and are negative.      Physical Exam     Initial Vitals   BP Pulse Resp Temp SpO2   24 1808 24 1808 24 1808 24 1813 24 1808   (!) 145/83 98 16 98.2 °F (36.8 °C) 98 %      MAP       --                Physical Exam    Nursing note and vitals reviewed.  Constitutional: She appears  well-developed and well-nourished.   HENT:   Head: Normocephalic and atraumatic.   Mouth/Throat: No oropharyngeal exudate.   Eyes: Conjunctivae and EOM are normal. Pupils are equal, round, and reactive to light.   Neck: Neck supple. No thyromegaly present.   Normal range of motion.  Cardiovascular:  Normal rate, regular rhythm, normal heart sounds and intact distal pulses.     Exam reveals no gallop and no friction rub.       No murmur heard.  Pulmonary/Chest: Breath sounds normal. No respiratory distress. She has no wheezes. She has no rhonchi. She exhibits no tenderness.   Abdominal: Abdomen is soft. Bowel sounds are normal. She exhibits no distension. There is no abdominal tenderness. There is no rebound and no guarding.   Musculoskeletal:         General: No tenderness or edema. Normal range of motion.      Cervical back: Normal range of motion and neck supple.     Lymphadenopathy:     She has no cervical adenopathy.   Neurological: She is alert and oriented to person, place, and time. She has normal strength. No cranial nerve deficit or sensory deficit. GCS score is 15. GCS eye subscore is 4. GCS verbal subscore is 5. GCS motor subscore is 6.   Skin: Skin is warm and dry. Capillary refill takes less than 2 seconds. No rash noted.   Psychiatric: She has a normal mood and affect. Her behavior is normal. Judgment and thought content normal.         ED Course   Procedures  Labs Reviewed   COMPREHENSIVE METABOLIC PANEL - Abnormal; Notable for the following components:       Result Value    CO2 22 (*)     Glucose 182 (*)     All other components within normal limits   INFLUENZA A & B BY MOLECULAR   CBC W/ AUTO DIFFERENTIAL   TROPONIN I   B-TYPE NATRIURETIC PEPTIDE   SARS-COV-2 RNA AMPLIFICATION, QUAL     EKG Readings: (Independently Interpreted)   Initial Reading: No STEMI. Rhythm: Normal Sinus Rhythm. Heart Rate: 97. Ectopy: No Ectopy. Conduction: Normal. ST Segments: Normal ST Segments. T Waves: Normal. Axis:  Normal. [facility] ED EKG2 - Other Findings: QR pattern in V1. Clinical Impression: Normal Sinus Rhythm Other Impression: unusual P axis   Other EKG Interpretations: Repeat EKG 2025  Nsr  76bpm  No stemi  No Ectopy   Normal axis     ECG Results              EKG 12-lead (In process)        Collection Time Result Time QRS Duration [facility] QTC Calculation    02/18/24 18:05:42 02/18/24 19:02:07 90 429                     In process by Interface, Lab In [facility] (02/18/24 19:02:11)                   Narrative:    Test Reason : R07.9,    Vent. Rate : 097 BPM     Atrial Rate : 097 BPM     P-R Int : 128 ms          QRS Dur : 090 ms      QT Int : 338 ms       P-R-T Axes : 142 037 094 degrees     QTc Int : 429 ms    Unusual P axis, possible ectopic atrial rhythm  Right ventricular conduction delay  Nonspecific T wave abnormality  Abnormal ECG  When compared with ECG of 27-JUL-2020 01:42,  Ectopic atrial rhythm has replaced Sinus rhythm  Nonspecific T wave abnormality now evident in Lateral leads    Referred By: [email or web address]   SELF           Confirmed By:                                   Imaging Results              X-Ray Chest AP Portable (Final result)  Result time 02/18/24 19:39:43      Final result by Mary Devlin MD (02/18/24 19:39:43)                   Impression:      No active or adverse finding portable exam      Electronically signed by: Mary Devlin  Date:    02/18/2024  Time:    19:39               Narrative:    EXAMINATION:  XR CHEST AP PORTABLE    CLINICAL HISTORY:  Chest Pain;    TECHNIQUE:  Single frontal portable view of the chest was performed.    COMPARISON:  04/12/2022    FINDINGS:  No pulmonary consolidation or pleural effusion.                                       Medications   sodium chloride 0.9% bolus 1,000 mL 1,000 mL (1,000 mLs Intravenous New Bag 2/18/24 1924)   aspirin tablet 325 mg (325 mg Oral Given 2/18/24 1924)   ondansetron injection 4 mg (4 mg Intravenous Given 2/18/24 1952)     Medical  "Decision Making  Amount and/or Complexity of Data Reviewed  Labs: ordered. Decision-making details documented in ED Course.  Radiology: ordered and independent interpretation performed. Decision-making details documented in ED Course.     Details: I independently interpreted the patient's cxr and did not identify any evidence of pneumonia or pneumothorax.    ECG/medicine tests: ordered and independent interpretation performed. Decision-making details documented in ED Course.    Risk  OTC drugs.  Prescription drug management.  Parenteral controlled substances.  Risk Details: OTC drugs, prescription drugs and controlled substances considered.  Due to patient's symptoms improving and pain controlled pain medications ordered appropriately.  DDX: MI, CAD, PUlmonary disease, PE, AAA, Pneumonia, Costochondritis, PTX, Liver disease                 ED Course as of 02/18/24 2201   Sun Feb 18, 2024 1819 Rhythm strip reviewed.  Nsr, no stemi. 97bpm [LV]      ED Course User Index  [LV] Madhu Donovan Jr., MD                  Vitals:    02/18/24 1808 02/18/24 1813 02/18/24 1818 02/18/24 1900   BP: (!) 145/83      Pulse: 98  98 94   Resp: 16   20   Temp:  98.2 °F (36.8 °C)     TempSrc:  Oral     SpO2: 98%   95%   Weight: 81.5 kg (179 lb 12.6 oz)      Height: 5' 2" (1.575 m)       02/18/24 1902 02/18/24 1947 02/18/24 2047   BP: 109/60 114/69 (!) 104/59   Pulse: 87 82 69   Resp: 19 18 20   Temp:      TempSrc:      SpO2: 95% 98% 99%   Weight:      Height:          Results for orders placed or performed during the hospital encounter of 02/18/24   Influenza A & B by Molecular    Specimen: Nasopharyngeal Swab   Result Value Ref Range    Influenza A, Molecular Negative Negative    Influenza B, Molecular Negative Negative    Flu A & B Source NP    CBC auto differential   Result Value Ref Range    WBC 11.73 3.90 - 12.70 K/uL    RBC 4.58 4.00 - 5.40 M/uL    Hemoglobin 13.9 12.0 - 16.0 g/dL    Hematocrit 41.6 37.0 - 48.5 %    MCV 91 82 - " 98 fL    MCH 30.3 27.0 - 31.0 pg    MCHC 33.4 32.0 - 36.0 g/dL    RDW 12.6 11.5 - 14.5 %    Platelets 283 150 - 450 K/uL    MPV 10.3 9.2 - 12.9 fL    Immature Granulocytes 0.3 0.0 - 0.5 %    Gran # (ANC) 7.2 1.8 - 7.7 K/uL    Immature Grans (Abs) 0.03 0.00 - 0.04 K/uL    Lymph # 3.5 1.0 - 4.8 K/uL    Mono # 0.9 0.3 - 1.0 K/uL    Eos # 0.1 0.0 - 0.5 K/uL    Baso # 0.07 0.00 - 0.20 K/uL    nRBC 0 0 /100 WBC    Gran % 61.0 38.0 - 73.0 %    Lymph % 30.2 18.0 - 48.0 %    Mono % 7.3 4.0 - 15.0 %    Eosinophil % 0.6 0.0 - 8.0 %    Basophil % 0.6 0.0 - 1.9 %    Differential Method Automated    Comprehensive metabolic panel   Result Value Ref Range    Sodium 140 136 - 145 mmol/L    Potassium 4.0 3.5 - 5.1 mmol/L    Chloride 105 95 - 110 mmol/L    CO2 22 (L) 23 - 29 mmol/L    Glucose 182 (H) 70 - 110 mg/dL    BUN 16 6 - 20 mg/dL    Creatinine 1.0 0.5 - 1.4 mg/dL    Calcium 9.4 8.7 - 10.5 mg/dL    Total Protein 7.4 6.0 - 8.4 g/dL    Albumin 3.9 3.5 - 5.2 g/dL    Total Bilirubin 0.9 0.1 - 1.0 mg/dL    Alkaline Phosphatase 124 55 - 135 U/L    AST 21 10 - 40 U/L    ALT 25 10 - 44 U/L    eGFR >60.0 >60 mL/min/1.73 m^2    Anion Gap 13 8 - 16 mmol/L   Troponin I #1   Result Value Ref Range    Troponin I <0.006 0.000 - 0.026 ng/mL   BNP   Result Value Ref Range    BNP <10 0 - 99 pg/mL   COVID-19 Rapid Screening   Result Value Ref Range    SARS-CoV-2 RNA, Amplification, Qual Negative Negative   EKG 12-lead   Result Value Ref Range    QRS Duration 90 ms    [facility] QTC Calculation 429 ms         Imaging Results              X-Ray Chest AP Portable (Final result)  Result time 02/18/24 19:39:43      Final result by Mary Devlin MD (02/18/24 19:39:43)                   Impression:      No active or adverse finding portable exam      Electronically signed by: Mary Devlin  Date:    02/18/2024  Time:    19:39               Narrative:    EXAMINATION:  XR CHEST AP PORTABLE    CLINICAL HISTORY:  Chest Pain;    TECHNIQUE:  Single  frontal portable view of the chest was performed.    COMPARISON:  04/12/2022    FINDINGS:  No pulmonary consolidation or pleural effusion.                                      Medications   sodium chloride 0.9% bolus 1,000 mL 1,000 mL (1,000 mLs Intravenous New Bag 2/18/24 1924)   aspirin tablet 325 mg (325 mg Oral Given 2/18/24 1924)   ondansetron injection 4 mg (4 mg Intravenous Given 2/18/24 1952)       9:56 PM - Re-evaluation: The patient is resting comfortably and is in no acute distress.  Pain and nausea have improved.  She states that her symptoms have improved after treatment within ER. Discussed test results, shared treatment plan, specific conditions for return, and importance of follow up with patient and family.  Advised close f/u c pcp within one week and to return to ER if any issues arise.  She understands and agrees with the plan as discussed. Answered  her questions at this time. She has remained hemodynamically stable throughout the ED course and is appropriate for discharge home.     Regarding CHEST PAIN, I advised the patient that chest pain can be caused by a range of conditions, from not serious to life-threatening such as: heart attack or a blood clot in your lungs, angina indicating poor blood flow to the heart; infection, inflammation, or a fracture in the bones or cartilage in chest wall; a digestive problem, such as acid reflux or a stomach ulcer; or even an anxiety attack.  Instructed patient to follow up with primary healthcare provider for reevaluation and possible diagnostic studies to find the actual cause of the chest pain. Patient was instructed to call 911 or go to the nearest emergency department if they develop any of the following signs of a heart attack: squeezing, pressure, or pain in the chest that lasts longer than five minutes or returns; discomfort or pain in the back, neck, jaw, stomach, or arm; trouble breathing; nausea or vomiting; lightheadedness;  or a sudden cold  sweat, especially with chest pain or trouble breathing.  Also return to the emergency department the chest discomfort gets worse (even with medicine); cough or vomit blood; have bowel movements that are black or bloody; cannot stop vomiting; or develop difficulty swallowing.    Pre-hypertension/Hypertension: The pt has been informed that they may have pre-hypertension or hypertension based on a blood pressure reading in the ED. I recommend that the pt call the PCP listed on their discharge instructions or a physician of their choice this week to arrange f/u for further evaluation of possible pre-hypertension or hypertension.     Norma Kang was given a handout which discussed their disease process, precautions, and instructions for follow-up and therapy.     Follow-up Information       [facility]. Schedule an appointment as soon as possible for a visit in 1 week.    Contact information:  [address]  [place] [address]  [phone]               [facility] - Internal Medicine. Schedule an appointment as soon as possible for a visit in 1 week.    Specialty: Internal Medicine  Contact information:  [address]  [place] [id number] [phone]  Additional information:  Please park in surface lot and check in at main registration.             [facility] - Emergency Dept.    Specialty: Emergency Medicine  Why: As needed, If symptoms worsen  Contact information:  [address]  [facility] [id number] [phone]                              Medication List        START taking these medications      naproxen 500 MG tablet  Commonly known as: NAPROSYN  Take 1 tablet (500 mg total) by mouth 2 (two) times daily with meals.            CHANGE how you take these medications      ondansetron 4 MG Tbdl  Commonly known as: ZOFRAN-ODT  Take 1 tablet (4 mg total) by mouth every 6 (six) hours as needed.  What changed: reasons to take this            ASK your doctor about these  medications      clindamycin 300 MG capsule  Commonly known as: CLEOCIN     diclofenac 50 MG EC tablet  Commonly known as: VOLTAREN  Take 1 tablet (50 mg total) by mouth 2 (two) times daily.     * ibuprofen 200 MG tablet  Commonly known as: ADVIL,MOTRIN     * ibuprofen 800 MG tablet  Commonly known as: ADVIL,MOTRIN     * ibuprofen 400 MG tablet  Commonly known as: ADVIL,MOTRIN  Take 1 tablet (400 mg total) by mouth every 6 (six) hours as needed.     predniSONE 10 MG tablet  Commonly known as: DELTASONE  Take 1 tablet (10 mg total) by mouth once daily. Take 4 tabs x 3 days, then take 2 tabs x 3 days, then take 1 tab x 3 days.     TOPAMAX 100 MG tablet  Generic drug: topiramate           * This list has 3 medication(s) that are the same as other medications prescribed for you. Read the directions carefully, and ask your doctor or other care provider to review them with you.                   Where to Get Your Medications        You can get these medications from any pharmacy    Bring a paper prescription for each of these medications  naproxen 500 MG tablet  ondansetron 4 MG Tbdl        Current Discharge Medication List            ED Diagnosis  1. Chest pain, unspecified type    2. Chest pain    3. Chest wall pain                 Clinical Impression:  Final diagnoses:  [R07.9] Chest pain, unspecified type (Primary)  [R07.89] Chest wall pain          ED Disposition Condition    Discharge Stable          ED Prescriptions       Medication Sig Dispense Start Date End Date Auth. Provider    ondansetron (ZOFRAN-ODT) 4 MG TbDL Take 1 tablet (4 mg total) by mouth every 6 (six) hours as needed. 30 tablet 2/18/2024 -- Madhu Donovan Jr., MD    naproxen (NAPROSYN) 500 MG tablet Take 1 tablet (500 mg total) by mouth 2 (two) times daily with meals. 20 tablet 2/18/2024 -- Madhu Donovan Jr., MD          Follow-up Information       Follow up With Specialties Details Why Contact Info Additional Information    [facility]   Schedule an appointment as soon as possible for a visit in 1 week  [address]  [place] [address]  [phone]       [facility] Internal Medicine Internal Medicine Schedule an appointment as soon as possible for a visit in 1 week  [address]  [facility] [id number] [phone] Please park in surface lot and check in at main registration.    [facility] - Emergency Dept Emergency Medicine  As needed, If symptoms worsen [address]  [facility] [id number] [phone]              Madhu Donovan Jr., MD  02/18/24 2206